# Patient Record
Sex: MALE | NOT HISPANIC OR LATINO | Employment: OTHER | URBAN - METROPOLITAN AREA
[De-identification: names, ages, dates, MRNs, and addresses within clinical notes are randomized per-mention and may not be internally consistent; named-entity substitution may affect disease eponyms.]

---

## 2021-04-20 ENCOUNTER — TRANSCRIBE ORDERS (OUTPATIENT)
Dept: ADMINISTRATIVE | Facility: HOSPITAL | Age: 66
End: 2021-04-20

## 2021-04-20 DIAGNOSIS — R31.29 MICROSCOPIC HEMATURIA: Primary | ICD-10-CM

## 2021-05-24 ENCOUNTER — TRANSCRIBE ORDERS (OUTPATIENT)
Dept: ADMINISTRATIVE | Facility: HOSPITAL | Age: 66
End: 2021-05-24

## 2021-05-24 DIAGNOSIS — M89.8X5 LYTIC BONE LESION OF HIP: Primary | ICD-10-CM

## 2021-08-19 ENCOUNTER — TRANSCRIBE ORDERS (OUTPATIENT)
Dept: ADMINISTRATIVE | Facility: HOSPITAL | Age: 66
End: 2021-08-19

## 2021-08-25 ENCOUNTER — TRANSCRIBE ORDERS (OUTPATIENT)
Dept: ADMINISTRATIVE | Facility: HOSPITAL | Age: 66
End: 2021-08-25

## 2021-08-27 ENCOUNTER — TRANSCRIBE ORDERS (OUTPATIENT)
Dept: ADMINISTRATIVE | Facility: HOSPITAL | Age: 66
End: 2021-08-27

## 2021-08-27 DIAGNOSIS — M89.8X5 LYTIC BONE LESION OF HIP: Primary | ICD-10-CM

## 2021-09-14 ENCOUNTER — HOSPITAL ENCOUNTER (OUTPATIENT)
Dept: NUCLEAR MEDICINE | Facility: HOSPITAL | Age: 66
Discharge: HOME | End: 2021-09-14

## 2021-09-14 DIAGNOSIS — M89.8X5 LYTIC BONE LESION OF HIP: ICD-10-CM

## 2021-09-14 PROCEDURE — 78306 BONE IMAGING WHOLE BODY: CPT

## 2021-09-14 PROCEDURE — A9503 TC99M MEDRONATE: HCPCS | Performed by: STUDENT IN AN ORGANIZED HEALTH CARE EDUCATION/TRAINING PROGRAM

## 2021-09-14 PROCEDURE — 0 TECHNETIUM MEDRONATE KIT: Performed by: STUDENT IN AN ORGANIZED HEALTH CARE EDUCATION/TRAINING PROGRAM

## 2021-09-14 RX ORDER — TC 99M MEDRONATE 20 MG/10ML
26.5 INJECTION, POWDER, LYOPHILIZED, FOR SOLUTION INTRAVENOUS
Status: COMPLETED | OUTPATIENT
Start: 2021-09-14 | End: 2021-09-14

## 2021-09-14 RX ADMIN — Medication 26.5 MILLICURIE: at 09:30

## 2021-10-12 ENCOUNTER — CONSULT (OUTPATIENT)
Dept: ONCOLOGY | Facility: CLINIC | Age: 66
End: 2021-10-12

## 2021-10-12 ENCOUNTER — LAB (OUTPATIENT)
Dept: LAB | Facility: HOSPITAL | Age: 66
End: 2021-10-12

## 2021-10-12 VITALS
HEIGHT: 72 IN | DIASTOLIC BLOOD PRESSURE: 80 MMHG | SYSTOLIC BLOOD PRESSURE: 121 MMHG | RESPIRATION RATE: 16 BRPM | WEIGHT: 246.5 LBS | TEMPERATURE: 97.4 F | HEART RATE: 62 BPM | OXYGEN SATURATION: 95 % | BODY MASS INDEX: 33.39 KG/M2

## 2021-10-12 DIAGNOSIS — C78.7 SECONDARY MALIGNANT NEOPLASM OF LIVER AND INTRAHEPATIC BILE DUCT (HCC): ICD-10-CM

## 2021-10-12 DIAGNOSIS — R94.8 ABNORMAL BONE SCAN OF THORACIC SPINE: ICD-10-CM

## 2021-10-12 DIAGNOSIS — C80.1 PRIMARY CANCER OF UNKNOWN SITE (HCC): Primary | ICD-10-CM

## 2021-10-12 DIAGNOSIS — K76.9 LESION OF LIVER: ICD-10-CM

## 2021-10-12 DIAGNOSIS — R93.2 ABNORMAL FINDINGS ON DIAGNOSTIC IMAGING OF LIVER AND BILIARY TRACT: ICD-10-CM

## 2021-10-12 DIAGNOSIS — R79.89 OTHER SPECIFIED ABNORMAL FINDINGS OF BLOOD CHEMISTRY: ICD-10-CM

## 2021-10-12 LAB
ALBUMIN SERPL-MCNC: 4.5 G/DL (ref 3.5–5.2)
ALBUMIN/GLOB SERPL: 2.3 G/DL
ALP SERPL-CCNC: 103 U/L (ref 39–117)
ALT SERPL W P-5'-P-CCNC: 23 U/L (ref 1–41)
ANION GAP SERPL CALCULATED.3IONS-SCNC: 11 MMOL/L (ref 5–15)
AST SERPL-CCNC: 20 U/L (ref 1–40)
BASOPHILS # BLD AUTO: 0.03 10*3/MM3 (ref 0–0.2)
BASOPHILS NFR BLD AUTO: 0.5 % (ref 0–1.5)
BILIRUB SERPL-MCNC: 0.6 MG/DL (ref 0–1.2)
BUN SERPL-MCNC: 23 MG/DL (ref 8–23)
BUN/CREAT SERPL: 21.3 (ref 7–25)
CALCIUM SPEC-SCNC: 9.4 MG/DL (ref 8.6–10.5)
CHLORIDE SERPL-SCNC: 102 MMOL/L (ref 98–107)
CO2 SERPL-SCNC: 27 MMOL/L (ref 22–29)
CREAT SERPL-MCNC: 1.08 MG/DL (ref 0.76–1.27)
DEPRECATED RDW RBC AUTO: 41.5 FL (ref 37–54)
EOSINOPHIL # BLD AUTO: 0.17 10*3/MM3 (ref 0–0.4)
EOSINOPHIL NFR BLD AUTO: 2.6 % (ref 0.3–6.2)
ERYTHROCYTE [DISTWIDTH] IN BLOOD BY AUTOMATED COUNT: 12.9 % (ref 12.3–15.4)
GFR SERPL CREATININE-BSD FRML MDRD: 68 ML/MIN/1.73
GFR SERPL CREATININE-BSD FRML MDRD: 83 ML/MIN/1.73
GLOBULIN UR ELPH-MCNC: 2 GM/DL
GLUCOSE SERPL-MCNC: 139 MG/DL (ref 65–99)
HCT VFR BLD AUTO: 41.9 % (ref 37.5–51)
HGB BLD-MCNC: 14.9 G/DL (ref 13–17.7)
IMM GRANULOCYTES # BLD AUTO: 0.06 10*3/MM3 (ref 0–0.05)
IMM GRANULOCYTES NFR BLD AUTO: 0.9 % (ref 0–0.5)
LYMPHOCYTES # BLD AUTO: 1.25 10*3/MM3 (ref 0.7–3.1)
LYMPHOCYTES NFR BLD AUTO: 19.1 % (ref 19.6–45.3)
MCH RBC QN AUTO: 31.5 PG (ref 26.6–33)
MCHC RBC AUTO-ENTMCNC: 35.6 G/DL (ref 31.5–35.7)
MCV RBC AUTO: 88.6 FL (ref 79–97)
MONOCYTES # BLD AUTO: 0.62 10*3/MM3 (ref 0.1–0.9)
MONOCYTES NFR BLD AUTO: 9.5 % (ref 5–12)
NEUTROPHILS NFR BLD AUTO: 4.4 10*3/MM3 (ref 1.7–7)
NEUTROPHILS NFR BLD AUTO: 67.4 % (ref 42.7–76)
NRBC BLD AUTO-RTO: 0 /100 WBC (ref 0–0.2)
PLATELET # BLD AUTO: 163 10*3/MM3 (ref 140–450)
PMV BLD AUTO: 10.4 FL (ref 6–12)
POTASSIUM SERPL-SCNC: 4 MMOL/L (ref 3.5–5.2)
PROT SERPL-MCNC: 6.5 G/DL (ref 6–8.5)
RBC # BLD AUTO: 4.73 10*6/MM3 (ref 4.14–5.8)
SODIUM SERPL-SCNC: 140 MMOL/L (ref 136–145)
WBC # BLD AUTO: 6.53 10*3/MM3 (ref 3.4–10.8)

## 2021-10-12 PROCEDURE — 84153 ASSAY OF PSA TOTAL: CPT

## 2021-10-12 PROCEDURE — 82105 ALPHA-FETOPROTEIN SERUM: CPT

## 2021-10-12 PROCEDURE — 80053 COMPREHEN METABOLIC PANEL: CPT

## 2021-10-12 PROCEDURE — 86301 IMMUNOASSAY TUMOR CA 19-9: CPT

## 2021-10-12 PROCEDURE — 36415 COLL VENOUS BLD VENIPUNCTURE: CPT

## 2021-10-12 PROCEDURE — 82232 ASSAY OF BETA-2 PROTEIN: CPT

## 2021-10-12 PROCEDURE — 83883 ASSAY NEPHELOMETRY NOT SPEC: CPT

## 2021-10-12 PROCEDURE — 85025 COMPLETE CBC W/AUTO DIFF WBC: CPT

## 2021-10-12 PROCEDURE — 84165 PROTEIN E-PHORESIS SERUM: CPT

## 2021-10-12 PROCEDURE — 82378 CARCINOEMBRYONIC ANTIGEN: CPT

## 2021-10-12 PROCEDURE — 99204 OFFICE O/P NEW MOD 45 MIN: CPT | Performed by: INTERNAL MEDICINE

## 2021-10-12 PROCEDURE — 82784 ASSAY IGA/IGD/IGG/IGM EACH: CPT

## 2021-10-12 PROCEDURE — 86334 IMMUNOFIX E-PHORESIS SERUM: CPT

## 2021-10-12 RX ORDER — LORATADINE 10 MG/1
10 TABLET ORAL DAILY
COMMUNITY

## 2021-10-12 RX ORDER — METOPROLOL SUCCINATE 100 MG/1
100 TABLET, EXTENDED RELEASE ORAL DAILY
COMMUNITY

## 2021-10-12 RX ORDER — ALLOPURINOL 100 MG/1
100 TABLET ORAL DAILY
COMMUNITY

## 2021-10-12 RX ORDER — ATORVASTATIN CALCIUM 80 MG/1
80 TABLET, FILM COATED ORAL DAILY
COMMUNITY

## 2021-10-12 RX ORDER — ASPIRIN 81 MG/1
81 TABLET ORAL DAILY
COMMUNITY

## 2021-10-12 RX ORDER — D-METHORPHAN/PE/ACETAMINOPHEN 10-5-325MG
CAPSULE ORAL
COMMUNITY

## 2021-10-12 RX ORDER — AMLODIPINE BESYLATE 2.5 MG/1
2.5 TABLET ORAL DAILY
COMMUNITY

## 2021-10-12 NOTE — PROGRESS NOTES
New Patient Office Visit      Date: 10/12/2021     Patient Name: Bhaskar Torres  MRN: [id number]  : 1955  Referring Physician: Ramesh Trivedi    Chief Complaint: Establish care for abnormal bone scan and liver lesions    History of Present Illness: Bhaskar Torres is a pleasant 66 y.o. male the past medical history of prostate cancer status post prostatectomy in , hypertension, gout, type 2 diabetes, hyperlipidemia who presents today for evaluation of abnormal bone scan and liver lesions. The patient has been having off-and-on back pain for the past several years which is been worse over the past couple months.  He had a CT scan in May 2021 which was concerning for lesions within the thoracic spine as well as some concerning liver lesions.  He underwent a bone scan which confirmed multiple concerning lesions within the thoracic spine.  He states he has not had any further biopsies at this time.  Does note that he had a chest x-ray in 2021 which per the patient was clear.  He denies any active smoking history.  He does note that he has some testicular swelling as well as sweating.  He denies any unexplained weight loss, fevers, night sweats.  He is otherwise stable and denies any vision changes, headaches, chest pain, palpitation, shortness of breath, cough, nausea, vomiting, diarrhea    Oncology History:    Oncology/Hematology History    No history exists.       Subjective      Review of Systems:     Constitutional: Negative for fevers, chills, or weight loss  Eyes: Negative for blurred vision or discharge         Ear/Nose/Throat: Negative for difficulty swallowing, sore throat, LAD                                                       Respiratory: Negative for cough, SOA, wheezing                                                                                        Cardiovascular: Negative for chest pain or palpitations                                                                   Gastrointestinal: Negative for nausea, vomiting or diarrhea                                                                     Genitourinary: Negative for dysuria or hematuria                                                                                           Musculoskeletal: Negative for any joint pains or muscle aches                                                                        Neurologic: Negative for any weakness, headaches, dizziness                                                                         Hematologic: Negative for any easy bleeding or bruising                                                                                   Psychiatric: Negative for anxiety or depression                             Past Medical History:   Past Medical History:   Diagnosis Date   • Arthritis    • Asthma    • Bronchitis    • Diabetes mellitus (HCC)    • Gout    • Hypertension    • Kidney stones    • Prostate cancer (HCC)        Past Surgical History:   Past Surgical History:   Procedure Laterality Date   • ADENOIDECTOMY     • HERNIA REPAIR     • PROSTATECTOMY     • THYROID SURGERY Left    • TONSILLECTOMY         Family History:   Family History   Problem Relation Age of Onset   • Heart disease Father    • Pancreatic cancer Maternal Grandfather 72       Social History:   Social History     Socioeconomic History   • Marital status: Unknown   Tobacco Use   • Smoking status: Never Smoker   • Smokeless tobacco: Never Used   Substance and Sexual Activity   • Alcohol use: Yes     Comment: 35oz per week   • Drug use: Never       Medications:     Current Outpatient Medications:   •  allopurinol (ZYLOPRIM) 100 MG tablet, Take 100 mg by mouth Daily., Disp: , Rfl:   •  amLODIPine (NORVASC) 2.5 MG tablet, Take 2.5 mg by mouth Daily., Disp: , Rfl:   •  aspirin 81 MG EC tablet, Take 81 mg by mouth Daily., Disp: , Rfl:   •  atorvastatin (LIPITOR) 80 MG tablet, Take 80 mg by mouth Daily., Disp: , Rfl:   •   "loratadine (CLARITIN) 10 MG tablet, Take 10 mg by mouth Daily., Disp: , Rfl:   •  metFORMIN (GLUCOPHAGE) 500 MG tablet, Take 500 mg by mouth 2 (Two) Times a Day With Meals., Disp: , Rfl:   •  metoprolol succinate XL (TOPROL-XL) 100 MG 24 hr tablet, Take 100 mg by mouth Daily., Disp: , Rfl:   •  Pediatric Multiple Vit-C-FA (Flinstones Gummies Omega-3 DHA) chewable tablet, Chew., Disp: , Rfl:   •  POTASSIUM PO, Take  by mouth., Disp: , Rfl:   •  VITAMIN D PO, Take  by mouth., Disp: , Rfl:     Allergies:   Allergies   Allergen Reactions   • Amoxicillin Other (See Comments)     Causes hair to fall out       Objective     Physical Exam:  Vital Signs:   Vitals:    10/12/21 1444   BP: 121/80   Pulse: 62   Resp: 16   Temp: 97.4 °F (36.3 °C)   TempSrc: Temporal   SpO2: 95%   Weight: 112 kg (246 lb 8 oz)   Height: 182.9 cm (72\")   PainSc:   3   PainLoc: Generalized     Pain Score    10/12/21 1444   PainSc:   3   PainLoc: Generalized     ECOG Performance Status: 0 - Asymptomatic    Constitutional: NAD, ECOG 0  Eyes: PERRLA, scleral anicteric  ENT: No LAD, no thyromegaly  Respiratory: CTAB, no wheezing, rales, rhonchi  Cardiovascular: RRR, no murmurs, pulses 2+ bilaterally  Abdomen: soft, NT/ND, no HSM  Musculoskeletal: strength 5/5 bilaterally, no c/c/e  Neurologic: A&O x 3, CN II-XII intact grossly  Psych: mood and affect congruent, no SI or HI    Results Review:   No visits with results within 2 Week(s) from this visit.   Latest known visit with results is:   No results found for any previous visit.       NM Bone Scan Whole Body    Result Date: 9/16/2021  Narrative: EXAMINATION: NM WHOLE-BODY BONE SCAN - 09/14/2021  INDICATION: M89.8X5-Other specified disorders of bone, thigh. Abnormality within the knees with back pain.  TECHNIQUE: 26.9 mCi of technetium 99 MDP was injected intravenously. Imaging is obtained of the whole body 4 hours later in the anterior and posterior projections. Spot imaging was obtained of the skull on " the lateral projection pelvis in the oblique projection, ribs in the oblique projection and hands in the lateral projection.  COMPARISON: None  FINDINGS: There is normal uptake of tracer seen within the pelvis and hips bilaterally. There is abnormal uptake of tracer involving the T11 vertebral body level, T7 and C6 levels. Findings concerning for possible underlying process such as malignancy or metastatic disease. Correlation to imaging of the thoracic spine is recommended. There is normal physiologic activity seen within the soft tissues, kidneys, and bladder. Degenerative changes seen within the knees and feet.      Impression: Abnormal uptake of tracer seen within the thoracic spine concerning for possible underlying lesions in which correlation to the thoracic spine imaging is recommended. The remainder of the bony skeleton reveals degenerative changes. No abnormality seen within the hips.   DICTATED:   09/15/2021 EDITED/ls :   09/15/2021  This report was finalized on 9/16/2021 5:16 PM by Dr. Marysol Warren MD.        Assessment / Plan      Assessment/Plan:   1. Primary cancer of unknown site (HCC) (Primary)  -High concern for malignancy given his history of prostate cancer and abnormal bone scan showing multiple thoracic spine lesions  -CT abdomen/pelvis in May 2021 concerning for multiple liver lesions  -We will get MRI abdomen to further evaluate his liver lesions as this may be more amenable to biopsy rather than his thoracic spine lesion  -We will check tumor markers as below as there is high concern for recurrent prostate cancer and potential colon cancer as he has not had a colonoscopy since 2016  -Depending on results, will consider further more definitive testing and biopsies  -     CT Chest With Contrast Diagnostic; Future  -     MRI Abdomen With & Without Contrast; Future    2. Abnormal bone scan of thoracic spine  -     CBC & Differential; Future  -     Comprehensive Metabolic Panel; Future  -      PSA DIAGNOSTIC; Future  -     Beta 2 Microglobulin, Serum; Future  -     MANNIE + PE; Future  -     Immunoglobulin Free LT Chains Blood; Future    3. Lesion of liver  -     CEA; Future  -     MRI Abdomen With & Without Contrast; Future    4. Other specified abnormal findings of blood chemistry   -     CEA; Future  -     Cancer Antigen 19-9; Future  -     AFP Tumor Marker; Future    5. 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 Abnormal findings on diagnostic imaging of liver and biliary tract   -     AFP Tumor Marker; Future           Follow Up:   Follow-up in 2-3 weeks     Rafa Tam MD  Hematology and Oncology     Please note that portions of this note may have been completed with a voice recognition program. Efforts were made to edit the dictations, but occasionally words are mistranscribed.

## 2021-10-13 LAB
ALPHA-FETOPROTEIN: 3.66 NG/ML (ref 0–8.3)
B2 MICROGLOB SERPL-MCNC: 2 MG/L (ref 0.8–2.2)
CANCER AG19-9 SERPL-ACNC: 35.7 U/ML
CEA SERPL-MCNC: 2.24 NG/ML
PSA SERPL-MCNC: <0.014 NG/ML (ref 0–4)

## 2021-10-14 LAB
ALBUMIN SERPL ELPH-MCNC: 4.1 G/DL (ref 2.9–4.4)
ALBUMIN/GLOB SERPL: 1.7 {RATIO} (ref 0.7–1.7)
ALPHA1 GLOB SERPL ELPH-MCNC: 0.2 G/DL (ref 0–0.4)
ALPHA2 GLOB SERPL ELPH-MCNC: 0.7 G/DL (ref 0.4–1)
B-GLOBULIN SERPL ELPH-MCNC: 1.1 G/DL (ref 0.7–1.3)
GAMMA GLOB SERPL ELPH-MCNC: 0.6 G/DL (ref 0.4–1.8)
GLOBULIN SER-MCNC: 2.5 G/DL (ref 2.2–3.9)
IGA SERPL-MCNC: 137 MG/DL (ref 61–437)
IGG SERPL-MCNC: 563 MG/DL (ref 603–1613)
IGM SERPL-MCNC: 33 MG/DL (ref 20–172)
INTERPRETATION SERPL IEP-IMP: ABNORMAL
KAPPA LC FREE SER-MCNC: 13.7 MG/L (ref 3.3–19.4)
KAPPA LC FREE/LAMBDA FREE SER: 1.22 {RATIO} (ref 0.26–1.65)
LABORATORY COMMENT REPORT: ABNORMAL
LAMBDA LC FREE SERPL-MCNC: 11.2 MG/L (ref 5.7–26.3)
M PROTEIN SERPL ELPH-MCNC: ABNORMAL G/DL
PROT SERPL-MCNC: 6.6 G/DL (ref 6–8.5)

## 2021-11-12 ENCOUNTER — OFFICE VISIT (OUTPATIENT)
Dept: ONCOLOGY | Facility: CLINIC | Age: 66
End: 2021-11-12

## 2021-11-12 VITALS
RESPIRATION RATE: 16 BRPM | HEART RATE: 66 BPM | SYSTOLIC BLOOD PRESSURE: 147 MMHG | OXYGEN SATURATION: 97 % | WEIGHT: 242.6 LBS | DIASTOLIC BLOOD PRESSURE: 86 MMHG | BODY MASS INDEX: 32.86 KG/M2 | TEMPERATURE: 96.2 F | HEIGHT: 72 IN

## 2021-11-12 DIAGNOSIS — R94.8 ABNORMAL BONE SCAN OF THORACIC SPINE: Primary | ICD-10-CM

## 2021-11-12 DIAGNOSIS — K76.9 LESION OF LIVER: ICD-10-CM

## 2021-11-12 PROCEDURE — 99214 OFFICE O/P EST MOD 30 MIN: CPT | Performed by: INTERNAL MEDICINE

## 2021-11-12 NOTE — PROGRESS NOTES
Follow Up Office Visit      Date: 2021     Patient Name: Bhaskar Torres  MRN: [id number]  : 1955  Referring Physician: Ramesh Trivedi     Chief Complaint:  Follow-up for abnormal bone scan and liver lesions     History of Present Illness: Bhaskar Torres is a pleasant 66 y.o. male the past medical history of prostate cancer status post prostatectomy in , hypertension, gout, type 2 diabetes, hyperlipidemia who presents today for evaluation of abnormal bone scan and liver lesions. The patient has been having off-and-on back pain for the past several years which is been worse over the past couple months.  He had a CT scan in May 2021 which was concerning for lesions within the thoracic spine as well as some concerning liver lesions.  He underwent a bone scan which confirmed multiple concerning lesions within the thoracic spine.  He states he has not had any further biopsies at this time.  Does note that he had a chest x-ray in 2021 which per the patient was clear.  He denies any active smoking history.  He does note that he has some testicular swelling as well as sweating.  He denies any unexplained weight loss, fevers, night sweats.  He is otherwise stable and denies any vision changes, headaches, chest pain, palpitation, shortness of breath, cough, nausea, vomiting, diarrhea    Interval History:  Presents to clinic for follow-up.  Continues to have significant back pain which is affecting his quality of life.  Otherwise denies any unexplained weight loss, fevers, chills, night sweats, abdominal pain    Oncology History:    Oncology/Hematology History    No history exists.       Subjective      Review of Systems:   Constitutional: Negative for fevers, chills, or weight loss  Eyes: Negative for blurred vision or discharge         Ear/Nose/Throat: Negative for difficulty swallowing, sore throat, LAD                                                       Respiratory: Negative for cough, SOA,  wheezing                                                                                        Cardiovascular: Negative for chest pain or palpitations                                                                  Gastrointestinal: Negative for nausea, vomiting or diarrhea                                                                     Genitourinary: Negative for dysuria or hematuria                                                                                           Musculoskeletal: Negative for any joint pains or muscle aches                                                                        Neurologic: Negative for any weakness, headaches, dizziness                                                                         Hematologic: Negative for any easy bleeding or bruising                                                                                   Psychiatric: Negative for anxiety or depression                          Past Medical History/Past Surgical History/ Family History/ Social History: Reviewed by me and unchanged from my previous documentation done on October 2021.     Medications:     Current Outpatient Medications:   •  allopurinol (ZYLOPRIM) 100 MG tablet, Take 100 mg by mouth Daily., Disp: , Rfl:   •  amLODIPine (NORVASC) 2.5 MG tablet, Take 2.5 mg by mouth Daily., Disp: , Rfl:   •  aspirin 81 MG EC tablet, Take 81 mg by mouth Daily., Disp: , Rfl:   •  atorvastatin (LIPITOR) 80 MG tablet, Take 80 mg by mouth Daily., Disp: , Rfl:   •  loratadine (CLARITIN) 10 MG tablet, Take 10 mg by mouth Daily., Disp: , Rfl:   •  metFORMIN (GLUCOPHAGE) 500 MG tablet, Take 500 mg by mouth 2 (Two) Times a Day With Meals., Disp: , Rfl:   •  metoprolol succinate XL (TOPROL-XL) 100 MG 24 hr tablet, Take 100 mg by mouth Daily., Disp: , Rfl:   •  Pediatric Multiple Vit-C-FA (Flinstones Gummies Omega-3 DHA) chewable tablet, Chew., Disp: , Rfl:   •  POTASSIUM PO, Take  by mouth., Disp: , Rfl:   •   "VITAMIN D PO, Take  by mouth., Disp: , Rfl:     Allergies:   Allergies   Allergen Reactions   • Amoxicillin Other (See Comments)     Causes hair to fall out       Objective     Physical Exam:  Vital Signs:   Vitals:    11/12/21 0825   BP: 147/86   Pulse: 66   Resp: 16   Temp: 96.2 °F (35.7 °C)   TempSrc: Temporal   SpO2: 97%   Weight: 110 kg (242 lb 9.6 oz)   Height: 182.9 cm (72\")   PainSc:   4   PainLoc: Foot  Comment: feet, knees, back, shoulder     Pain Score    11/12/21 0825   PainSc:   4   PainLoc: Foot  Comment: feet, knees, back, shoulder     ECOG Performance Status: 1 - Symptomatic but completely ambulatory    Constitutional: NAD, ECOG 1  Eyes: PERRLA, scleral anicteric  ENT: No LAD, no thyromegaly  Respiratory: CTAB, no wheezing, rales, rhonchi  Cardiovascular: RRR, no murmurs, pulses 2+ bilaterally  Abdomen: soft, NT/ND, no HSM  Musculoskeletal: strength 5/5 bilaterally, no c/c/e  Neurologic: A&O x 3, CN II-XII intact grossly    Results Review:   No visits with results within 2 Week(s) from this visit.   Latest known visit with results is:   Lab on 10/12/2021   Component Date Value Ref Range Status   • Glucose 10/12/2021 139* 65 - 99 mg/dL Final   • BUN 10/12/2021 23  8 - 23 mg/dL Final   • Creatinine 10/12/2021 1.08  0.76 - 1.27 mg/dL Final   • Sodium 10/12/2021 140  136 - 145 mmol/L Final   • Potassium 10/12/2021 4.0  3.5 - 5.2 mmol/L Final   • Chloride 10/12/2021 102  98 - 107 mmol/L Final   • CO2 10/12/2021 27.0  22.0 - 29.0 mmol/L Final   • Calcium 10/12/2021 9.4  8.6 - 10.5 mg/dL Final   • Total Protein 10/12/2021 6.5  6.0 - 8.5 g/dL Final   • Albumin 10/12/2021 4.50  3.50 - 5.20 g/dL Final   • ALT (SGPT) 10/12/2021 23  1 - 41 U/L Final   • AST (SGOT) 10/12/2021 20  1 - 40 U/L Final   • Alkaline Phosphatase 10/12/2021 103  39 - 117 U/L Final   • Total Bilirubin 10/12/2021 0.6  0.0 - 1.2 mg/dL Final   • eGFR Non  Amer 10/12/2021 68  >60 mL/min/1.73 Final   • eGFR   Amer 10/12/2021 83  " >60 mL/min/1.73 Final   • Globulin 10/12/2021 2.0  gm/dL Final   • A/G Ratio 10/12/2021 2.3  g/dL Final   • BUN/Creatinine Ratio 10/12/2021 21.3  7.0 - 25.0 Final   • Anion Gap 10/12/2021 11.0  5.0 - 15.0 mmol/L Final   • PSA 10/12/2021 <0.014  0.000 - 4.000 ng/mL Final   • CEA 10/12/2021 2.24  ng/mL Final   • CA 19-9 10/12/2021 35.7* <=35.0 U/mL Final   • ALPHA-FETOPROTEIN 10/12/2021 3.66  0 - 8.3 ng/mL Final   • Beta-2 Microglobulin 10/12/2021 2.0  0.8 - 2.2 mg/L Final   • IgG 10/12/2021 563* 603 - 1613 mg/dL Final   • IgA 10/12/2021 137  61 - 437 mg/dL Final   • IgM 10/12/2021 33  20 - 172 mg/dL Final   • Total Protein 10/12/2021 6.6  6.0 - 8.5 g/dL Final   • Albumin 10/12/2021 4.1  2.9 - 4.4 g/dL Final   • Alpha-1-Globulin 10/12/2021 0.2  0.0 - 0.4 g/dL Final   • Alpha-2-Globulin 10/12/2021 0.7  0.4 - 1.0 g/dL Final   • Beta Globulin 10/12/2021 1.1  0.7 - 1.3 g/dL Final   • Gamma Globulin 10/12/2021 0.6  0.4 - 1.8 g/dL Final   • M-Adelso 10/12/2021 Not Observed  Not Observed g/dL Final   • Globulin 10/12/2021 2.5  2.2 - 3.9 g/dL Final   • A/G Ratio 10/12/2021 1.7  0.7 - 1.7 Final   • Immunofixation Reflex, Serum 10/12/2021 Comment:   Final    Presence of monoclonal protein is unclear at this time. Suggest  repeat in 3 to 6 months if clinically indicated.   • Please note 10/12/2021 Comment   Final    Protein electrophoresis scan will follow via computer, mail, or   delivery.   • Free Light Chain, Kappa 10/12/2021 13.7  3.3 - 19.4 mg/L Final   • Free Lambda Light Chains 10/12/2021 11.2  5.7 - 26.3 mg/L Final   • Kappa/Lambda Ratio 10/12/2021 1.22  0.26 - 1.65 Final   • WBC 10/12/2021 6.53  3.40 - 10.80 10*3/mm3 Final   • RBC 10/12/2021 4.73  4.14 - 5.80 10*6/mm3 Final   • Hemoglobin 10/12/2021 14.9  13.0 - 17.7 g/dL Final   • Hematocrit 10/12/2021 41.9  37.5 - 51.0 % Final   • MCV 10/12/2021 88.6  79.0 - 97.0 fL Final   • MCH 10/12/2021 31.5  26.6 - 33.0 pg Final   • MCHC 10/12/2021 35.6  31.5 - 35.7 g/dL  Final   • RDW 10/12/2021 12.9  12.3 - 15.4 % Final   • RDW-SD 10/12/2021 41.5  37.0 - 54.0 fl Final   • MPV 10/12/2021 10.4  6.0 - 12.0 fL Final   • Platelets 10/12/2021 163  140 - 450 10*3/mm3 Final   • Neutrophil % 10/12/2021 67.4  42.7 - 76.0 % Final   • Lymphocyte % 10/12/2021 19.1* 19.6 - 45.3 % Final   • Monocyte % 10/12/2021 9.5  5.0 - 12.0 % Final   • Eosinophil % 10/12/2021 2.6  0.3 - 6.2 % Final   • Basophil % 10/12/2021 0.5  0.0 - 1.5 % Final   • Immature Grans % 10/12/2021 0.9* 0.0 - 0.5 % Final   • Neutrophils, Absolute 10/12/2021 4.40  1.70 - 7.00 10*3/mm3 Final   • Lymphocytes, Absolute 10/12/2021 1.25  0.70 - 3.10 10*3/mm3 Final   • Monocytes, Absolute 10/12/2021 0.62  0.10 - 0.90 10*3/mm3 Final   • Eosinophils, Absolute 10/12/2021 0.17  0.00 - 0.40 10*3/mm3 Final   • Basophils, Absolute 10/12/2021 0.03  0.00 - 0.20 10*3/mm3 Final   • Immature Grans, Absolute 10/12/2021 0.06* 0.00 - 0.05 10*3/mm3 Final   • nRBC 10/12/2021 0.0  0.0 - 0.2 /100 WBC Final       No results found.    Assessment / Plan      Assessment/Plan:   1. Abnormal bone scan of thoracic spine (Primary)  -Initially high concern for malignancy given his history of prostate cancer and abnormal bone scan showing multiple thoracic spine lesions  -CT abdomen/pelvis in May 2021 concerning for multiple liver lesions  -MRI abdomen showing only benign liver cyst and renal cyst  -CT chest showing no thoracic nodule/lesion and only degenerative changes in the thoracic spine correlating with the abnormalities on the bone scan  -PSA, CEA, AFP within normal limits.  CA 19-9 mildly elevated at 35.7 however upper limit of normal is 35 and he has no overt liver or pancreatic lesion seen on MRI abdomen so low concern for any intraabdominal malignancy at this time  -SPEP showing no M spike but uncertain presence of monoclonal protein.  Kappa/lambda light chains within normal limits, beta-2 microglobulin within normal limits.  Low concern for a plasma  cell dyscrasia.  No further work-up required  -At this point, patient does not have any evidence of active malignancy.  Abnormality seen on bone scan likely due to degenerative changes and osteoarthritis  -No further oncologic work-up required at this time including biopsy  -Advised patient to continue to follow with his PCP for further management of his back pain  -Advised patient to follow-up with his PCP for referrals for screening colonoscopies    2. Lesion of liver  -MRI abdomen showing 3 benign liver cyst  -CEA, AFP within normal limits  -No further work-up required      Follow Up:   Follow-up as needed     Rafa Tam MD  Hematology and Oncology     Please note that portions of this note may have been completed with a voice recognition program. Efforts were made to edit the dictations, but occasionally words are mistranscribed.

## 2023-10-13 ENCOUNTER — HOSPITAL ENCOUNTER (OUTPATIENT)
Facility: HOSPITAL | Age: 68
Discharge: HOME | End: 2023-10-15
Attending: STUDENT IN AN ORGANIZED HEALTH CARE EDUCATION/TRAINING PROGRAM | Admitting: INTERNAL MEDICINE
Payer: MEDICARE

## 2023-10-13 ENCOUNTER — APPOINTMENT (OUTPATIENT)
Dept: CT IMAGING | Facility: HOSPITAL | Age: 68
End: 2023-10-13
Payer: MEDICARE

## 2023-10-13 DIAGNOSIS — E11.65 TYPE 2 DIABETES MELLITUS WITH HYPERGLYCEMIA, WITHOUT LONG-TERM CURRENT USE OF INSULIN: Primary | ICD-10-CM

## 2023-10-13 DIAGNOSIS — E86.0 DEHYDRATION: ICD-10-CM

## 2023-10-13 DIAGNOSIS — R53.83 FATIGUE, UNSPECIFIED TYPE: ICD-10-CM

## 2023-10-13 DIAGNOSIS — R10.9 ABDOMINAL PAIN: ICD-10-CM

## 2023-10-13 DIAGNOSIS — R10.13 EPIGASTRIC PAIN: ICD-10-CM

## 2023-10-13 PROBLEM — B37.0 ORAL CANDIDIASIS: Status: ACTIVE | Noted: 2023-10-13

## 2023-10-13 PROBLEM — R63.4 WEIGHT LOSS: Status: ACTIVE | Noted: 2023-10-13

## 2023-10-13 LAB
ALBUMIN SERPL-MCNC: 4.3 G/DL (ref 3.5–5.2)
ALBUMIN/GLOB SERPL: 2 G/DL
ALP SERPL-CCNC: 105 U/L (ref 39–117)
ALT SERPL W P-5'-P-CCNC: 44 U/L (ref 1–41)
ANION GAP SERPL CALCULATED.3IONS-SCNC: 10 MMOL/L (ref 5–15)
ANION GAP SERPL CALCULATED.3IONS-SCNC: 8 MMOL/L (ref 5–15)
AST SERPL-CCNC: 19 U/L (ref 1–40)
B-OH-BUTYR SERPL-SCNC: 0.72 MMOL/L (ref 0.02–0.27)
BACTERIA UR QL AUTO: ABNORMAL /HPF
BASOPHILS # BLD AUTO: 0.02 10*3/MM3 (ref 0–0.2)
BASOPHILS NFR BLD AUTO: 0.2 % (ref 0–1.5)
BILIRUB SERPL-MCNC: 1.3 MG/DL (ref 0–1.2)
BILIRUB UR QL STRIP: NEGATIVE
BUN SERPL-MCNC: 26 MG/DL (ref 8–23)
BUN SERPL-MCNC: 34 MG/DL (ref 8–23)
BUN/CREAT SERPL: 25.7 (ref 7–25)
BUN/CREAT SERPL: 26.4 (ref 7–25)
CALCIUM SPEC-SCNC: 8.8 MG/DL (ref 8.6–10.5)
CALCIUM SPEC-SCNC: 9.7 MG/DL (ref 8.6–10.5)
CHLORIDE SERPL-SCNC: 100 MMOL/L (ref 98–107)
CHLORIDE SERPL-SCNC: 102 MMOL/L (ref 98–107)
CLARITY UR: ABNORMAL
CO2 SERPL-SCNC: 30 MMOL/L (ref 22–29)
CO2 SERPL-SCNC: 30 MMOL/L (ref 22–29)
COLOR UR: YELLOW
CREAT BLDA-MCNC: 1.3 MG/DL
CREAT BLDA-MCNC: 1.3 MG/DL (ref 0.6–1.3)
CREAT SERPL-MCNC: 1.01 MG/DL (ref 0.76–1.27)
CREAT SERPL-MCNC: 1.29 MG/DL (ref 0.76–1.27)
D-LACTATE SERPL-SCNC: 1.7 MMOL/L (ref 0.5–2)
DEPRECATED RDW RBC AUTO: 45.1 FL (ref 37–54)
EGFRCR SERPLBLD CKD-EPI 2021: 60.4 ML/MIN/1.73
EGFRCR SERPLBLD CKD-EPI 2021: 81 ML/MIN/1.73
EOSINOPHIL # BLD AUTO: 0.05 10*3/MM3 (ref 0–0.4)
EOSINOPHIL NFR BLD AUTO: 0.6 % (ref 0.3–6.2)
ERYTHROCYTE [DISTWIDTH] IN BLOOD BY AUTOMATED COUNT: 13.5 % (ref 12.3–15.4)
GLOBULIN UR ELPH-MCNC: 2.1 GM/DL
GLUCOSE BLDC GLUCOMTR-MCNC: 250 MG/DL (ref 70–130)
GLUCOSE BLDC GLUCOMTR-MCNC: 263 MG/DL (ref 70–130)
GLUCOSE BLDC GLUCOMTR-MCNC: 318 MG/DL (ref 70–130)
GLUCOSE SERPL-MCNC: 277 MG/DL (ref 65–99)
GLUCOSE SERPL-MCNC: 447 MG/DL (ref 65–99)
GLUCOSE UR STRIP-MCNC: ABNORMAL MG/DL
HBA1C MFR BLD: 11.3 % (ref 4.8–5.6)
HCT VFR BLD AUTO: 43.6 % (ref 37.5–51)
HGB BLD-MCNC: 15.4 G/DL (ref 13–17.7)
HGB UR QL STRIP.AUTO: ABNORMAL
HOLD SPECIMEN: NORMAL
HYALINE CASTS UR QL AUTO: ABNORMAL /LPF
IMM GRANULOCYTES # BLD AUTO: 0.11 10*3/MM3 (ref 0–0.05)
IMM GRANULOCYTES NFR BLD AUTO: 1.3 % (ref 0–0.5)
KETONES UR QL STRIP: ABNORMAL
LEUKOCYTE ESTERASE UR QL STRIP.AUTO: NEGATIVE
LIPASE SERPL-CCNC: 29 U/L (ref 13–60)
LIPASE SERPL-CCNC: 36 U/L (ref 13–60)
LYMPHOCYTES # BLD AUTO: 0.54 10*3/MM3 (ref 0.7–3.1)
LYMPHOCYTES NFR BLD AUTO: 6.5 % (ref 19.6–45.3)
MAGNESIUM SERPL-MCNC: 1.9 MG/DL (ref 1.6–2.4)
MCH RBC QN AUTO: 32.6 PG (ref 26.6–33)
MCHC RBC AUTO-ENTMCNC: 35.3 G/DL (ref 31.5–35.7)
MCV RBC AUTO: 92.4 FL (ref 79–97)
MONOCYTES # BLD AUTO: 0.66 10*3/MM3 (ref 0.1–0.9)
MONOCYTES NFR BLD AUTO: 7.9 % (ref 5–12)
NEUTROPHILS NFR BLD AUTO: 6.97 10*3/MM3 (ref 1.7–7)
NEUTROPHILS NFR BLD AUTO: 83.5 % (ref 42.7–76)
NITRITE UR QL STRIP: NEGATIVE
NRBC BLD AUTO-RTO: 0 /100 WBC (ref 0–0.2)
PH UR STRIP.AUTO: 5.5 [PH] (ref 5–8)
PLATELET # BLD AUTO: 157 10*3/MM3 (ref 140–450)
PMV BLD AUTO: 10.2 FL (ref 6–12)
POTASSIUM SERPL-SCNC: 4 MMOL/L (ref 3.5–5.2)
POTASSIUM SERPL-SCNC: 4.3 MMOL/L (ref 3.5–5.2)
PROCALCITONIN SERPL-MCNC: 0.1 NG/ML (ref 0–0.25)
PROT SERPL-MCNC: 6.4 G/DL (ref 6–8.5)
PROT UR QL STRIP: NEGATIVE
RBC # BLD AUTO: 4.72 10*6/MM3 (ref 4.14–5.8)
RBC # UR STRIP: ABNORMAL /HPF
REF LAB TEST METHOD: ABNORMAL
SODIUM SERPL-SCNC: 140 MMOL/L (ref 136–145)
SODIUM SERPL-SCNC: 140 MMOL/L (ref 136–145)
SP GR UR STRIP: 1.04 (ref 1–1.03)
SQUAMOUS #/AREA URNS HPF: ABNORMAL /HPF
TSH SERPL DL<=0.05 MIU/L-ACNC: 1 UIU/ML (ref 0.27–4.2)
UROBILINOGEN UR QL STRIP: ABNORMAL
WBC # UR STRIP: ABNORMAL /HPF
WBC NRBC COR # BLD: 8.35 10*3/MM3 (ref 3.4–10.8)
WHOLE BLOOD HOLD COAG: NORMAL
WHOLE BLOOD HOLD SPECIMEN: NORMAL

## 2023-10-13 PROCEDURE — 63710000001 INSULIN LISPRO (HUMAN) PER 5 UNITS: Performed by: INTERNAL MEDICINE

## 2023-10-13 PROCEDURE — 82948 REAGENT STRIP/BLOOD GLUCOSE: CPT

## 2023-10-13 PROCEDURE — 86235 NUCLEAR ANTIGEN ANTIBODY: CPT | Performed by: INTERNAL MEDICINE

## 2023-10-13 PROCEDURE — 96365 THER/PROPH/DIAG IV INF INIT: CPT

## 2023-10-13 PROCEDURE — 25810000003 SODIUM CHLORIDE 0.9 % SOLUTION: Performed by: INTERNAL MEDICINE

## 2023-10-13 PROCEDURE — 84145 PROCALCITONIN (PCT): CPT | Performed by: INTERNAL MEDICINE

## 2023-10-13 PROCEDURE — 80053 COMPREHEN METABOLIC PANEL: CPT | Performed by: STUDENT IN AN ORGANIZED HEALTH CARE EDUCATION/TRAINING PROGRAM

## 2023-10-13 PROCEDURE — 83690 ASSAY OF LIPASE: CPT | Performed by: INTERNAL MEDICINE

## 2023-10-13 PROCEDURE — 83690 ASSAY OF LIPASE: CPT | Performed by: STUDENT IN AN ORGANIZED HEALTH CARE EDUCATION/TRAINING PROGRAM

## 2023-10-13 PROCEDURE — 96375 TX/PRO/DX INJ NEW DRUG ADDON: CPT

## 2023-10-13 PROCEDURE — 63710000001 INSULIN REGULAR HUMAN PER 5 UNITS: Performed by: INTERNAL MEDICINE

## 2023-10-13 PROCEDURE — 63710000001 INSULIN DETEMIR PER 5 UNITS: Performed by: INTERNAL MEDICINE

## 2023-10-13 PROCEDURE — 25010000002 THIAMINE PER 100 MG: Performed by: INTERNAL MEDICINE

## 2023-10-13 PROCEDURE — 25010000002 FLUCONAZOLE PER 200 MG: Performed by: INTERNAL MEDICINE

## 2023-10-13 PROCEDURE — 25010000002 ONDANSETRON PER 1 MG: Performed by: NURSE PRACTITIONER

## 2023-10-13 PROCEDURE — 83605 ASSAY OF LACTIC ACID: CPT | Performed by: STUDENT IN AN ORGANIZED HEALTH CARE EDUCATION/TRAINING PROGRAM

## 2023-10-13 PROCEDURE — 86301 IMMUNOASSAY TUMOR CA 19-9: CPT | Performed by: INTERNAL MEDICINE

## 2023-10-13 PROCEDURE — 81001 URINALYSIS AUTO W/SCOPE: CPT | Performed by: STUDENT IN AN ORGANIZED HEALTH CARE EDUCATION/TRAINING PROGRAM

## 2023-10-13 PROCEDURE — 25810000003 SODIUM CHLORIDE 0.9 % SOLUTION: Performed by: NURSE PRACTITIONER

## 2023-10-13 PROCEDURE — 86225 DNA ANTIBODY NATIVE: CPT | Performed by: INTERNAL MEDICINE

## 2023-10-13 PROCEDURE — G0378 HOSPITAL OBSERVATION PER HR: HCPCS

## 2023-10-13 PROCEDURE — 83735 ASSAY OF MAGNESIUM: CPT | Performed by: INTERNAL MEDICINE

## 2023-10-13 PROCEDURE — 84443 ASSAY THYROID STIM HORMONE: CPT | Performed by: INTERNAL MEDICINE

## 2023-10-13 PROCEDURE — 85025 COMPLETE CBC W/AUTO DIFF WBC: CPT | Performed by: STUDENT IN AN ORGANIZED HEALTH CARE EDUCATION/TRAINING PROGRAM

## 2023-10-13 PROCEDURE — 99285 EMERGENCY DEPT VISIT HI MDM: CPT

## 2023-10-13 PROCEDURE — 83036 HEMOGLOBIN GLYCOSYLATED A1C: CPT | Performed by: INTERNAL MEDICINE

## 2023-10-13 PROCEDURE — 25010000002 MORPHINE PER 10 MG: Performed by: STUDENT IN AN ORGANIZED HEALTH CARE EDUCATION/TRAINING PROGRAM

## 2023-10-13 PROCEDURE — 74177 CT ABD & PELVIS W/CONTRAST: CPT

## 2023-10-13 PROCEDURE — 25510000001 IOPAMIDOL 61 % SOLUTION: Performed by: STUDENT IN AN ORGANIZED HEALTH CARE EDUCATION/TRAINING PROGRAM

## 2023-10-13 PROCEDURE — 82010 KETONE BODYS QUAN: CPT | Performed by: NURSE PRACTITIONER

## 2023-10-13 PROCEDURE — 82565 ASSAY OF CREATININE: CPT

## 2023-10-13 RX ORDER — BISACODYL 5 MG/1
5 TABLET, DELAYED RELEASE ORAL DAILY PRN
Status: DISCONTINUED | OUTPATIENT
Start: 2023-10-13 | End: 2023-10-15 | Stop reason: HOSPADM

## 2023-10-13 RX ORDER — INSULIN LISPRO 100 [IU]/ML
1-200 INJECTION, SOLUTION INTRAVENOUS; SUBCUTANEOUS AS NEEDED
Status: DISCONTINUED | OUTPATIENT
Start: 2023-10-13 | End: 2023-10-15 | Stop reason: HOSPADM

## 2023-10-13 RX ORDER — ATORVASTATIN CALCIUM 40 MG/1
80 TABLET, FILM COATED ORAL NIGHTLY
Status: DISCONTINUED | OUTPATIENT
Start: 2023-10-13 | End: 2023-10-15 | Stop reason: HOSPADM

## 2023-10-13 RX ORDER — SODIUM CHLORIDE 0.9 % (FLUSH) 0.9 %
10 SYRINGE (ML) INJECTION AS NEEDED
Status: DISCONTINUED | OUTPATIENT
Start: 2023-10-13 | End: 2023-10-15 | Stop reason: HOSPADM

## 2023-10-13 RX ORDER — INSULIN LISPRO 100 [IU]/ML
1-200 INJECTION, SOLUTION INTRAVENOUS; SUBCUTANEOUS
Status: DISCONTINUED | OUTPATIENT
Start: 2023-10-13 | End: 2023-10-14

## 2023-10-13 RX ORDER — ONDANSETRON 4 MG/1
4 TABLET, FILM COATED ORAL EVERY 8 HOURS PRN
COMMUNITY

## 2023-10-13 RX ORDER — MORPHINE SULFATE 4 MG/ML
4 INJECTION, SOLUTION INTRAMUSCULAR; INTRAVENOUS ONCE
Status: COMPLETED | OUTPATIENT
Start: 2023-10-13 | End: 2023-10-13

## 2023-10-13 RX ORDER — NICOTINE POLACRILEX 4 MG
15 LOZENGE BUCCAL
Status: DISCONTINUED | OUTPATIENT
Start: 2023-10-13 | End: 2023-10-14

## 2023-10-13 RX ORDER — SODIUM CHLORIDE 0.9 % (FLUSH) 0.9 %
10 SYRINGE (ML) INJECTION EVERY 12 HOURS SCHEDULED
Status: DISCONTINUED | OUTPATIENT
Start: 2023-10-13 | End: 2023-10-15 | Stop reason: HOSPADM

## 2023-10-13 RX ORDER — LORAZEPAM 2 MG/ML
2 INJECTION INTRAMUSCULAR
Status: DISCONTINUED | OUTPATIENT
Start: 2023-10-13 | End: 2023-10-14

## 2023-10-13 RX ORDER — LORAZEPAM 1 MG/1
2 TABLET ORAL
Status: DISCONTINUED | OUTPATIENT
Start: 2023-10-13 | End: 2023-10-14

## 2023-10-13 RX ORDER — PANTOPRAZOLE SODIUM 40 MG/10ML
40 INJECTION, POWDER, LYOPHILIZED, FOR SOLUTION INTRAVENOUS
Status: DISCONTINUED | OUTPATIENT
Start: 2023-10-14 | End: 2023-10-14

## 2023-10-13 RX ORDER — GLIPIZIDE 2.5 MG/1
2.5 TABLET, EXTENDED RELEASE ORAL DAILY
Status: ON HOLD | COMMUNITY
End: 2023-10-15 | Stop reason: SDUPTHER

## 2023-10-13 RX ORDER — LORAZEPAM 1 MG/1
1 TABLET ORAL EVERY 6 HOURS
Status: DISCONTINUED | OUTPATIENT
Start: 2023-10-14 | End: 2023-10-14

## 2023-10-13 RX ORDER — AMLODIPINE BESYLATE 2.5 MG/1
2.5 TABLET ORAL DAILY
Status: DISCONTINUED | OUTPATIENT
Start: 2023-10-13 | End: 2023-10-15 | Stop reason: HOSPADM

## 2023-10-13 RX ORDER — MORPHINE SULFATE 2 MG/ML
2 INJECTION, SOLUTION INTRAMUSCULAR; INTRAVENOUS EVERY 4 HOURS PRN
Status: DISCONTINUED | OUTPATIENT
Start: 2023-10-13 | End: 2023-10-15 | Stop reason: HOSPADM

## 2023-10-13 RX ORDER — ALLOPURINOL 100 MG/1
100 TABLET ORAL DAILY
Status: DISCONTINUED | OUTPATIENT
Start: 2023-10-13 | End: 2023-10-15 | Stop reason: HOSPADM

## 2023-10-13 RX ORDER — BISACODYL 10 MG
10 SUPPOSITORY, RECTAL RECTAL DAILY PRN
Status: DISCONTINUED | OUTPATIENT
Start: 2023-10-13 | End: 2023-10-15 | Stop reason: HOSPADM

## 2023-10-13 RX ORDER — ONDANSETRON 2 MG/ML
4 INJECTION INTRAMUSCULAR; INTRAVENOUS EVERY 6 HOURS PRN
Status: DISCONTINUED | OUTPATIENT
Start: 2023-10-13 | End: 2023-10-15 | Stop reason: HOSPADM

## 2023-10-13 RX ORDER — AMOXICILLIN 250 MG
2 CAPSULE ORAL 2 TIMES DAILY
Status: DISCONTINUED | OUTPATIENT
Start: 2023-10-13 | End: 2023-10-15 | Stop reason: HOSPADM

## 2023-10-13 RX ORDER — FOLIC ACID 1 MG/1
1 TABLET ORAL DAILY
Status: DISCONTINUED | OUTPATIENT
Start: 2023-10-13 | End: 2023-10-14

## 2023-10-13 RX ORDER — SODIUM CHLORIDE 9 MG/ML
10 INJECTION INTRAVENOUS AS NEEDED
Status: DISCONTINUED | OUTPATIENT
Start: 2023-10-13 | End: 2023-10-15 | Stop reason: HOSPADM

## 2023-10-13 RX ORDER — POLYETHYLENE GLYCOL 3350 17 G/17G
17 POWDER, FOR SOLUTION ORAL DAILY PRN
Status: DISCONTINUED | OUTPATIENT
Start: 2023-10-13 | End: 2023-10-15 | Stop reason: HOSPADM

## 2023-10-13 RX ORDER — THIAMINE HYDROCHLORIDE 100 MG/ML
200 INJECTION, SOLUTION INTRAMUSCULAR; INTRAVENOUS EVERY 8 HOURS SCHEDULED
Status: DISCONTINUED | OUTPATIENT
Start: 2023-10-13 | End: 2023-10-14 | Stop reason: SDUPTHER

## 2023-10-13 RX ORDER — ASPIRIN 81 MG/1
81 TABLET ORAL DAILY
Status: DISCONTINUED | OUTPATIENT
Start: 2023-10-13 | End: 2023-10-15 | Stop reason: HOSPADM

## 2023-10-13 RX ORDER — LORAZEPAM 2 MG/ML
1 INJECTION INTRAMUSCULAR
Status: DISCONTINUED | OUTPATIENT
Start: 2023-10-13 | End: 2023-10-14

## 2023-10-13 RX ORDER — IBUPROFEN 600 MG/1
1 TABLET ORAL
Status: DISCONTINUED | OUTPATIENT
Start: 2023-10-13 | End: 2023-10-14

## 2023-10-13 RX ORDER — LORAZEPAM 1 MG/1
2 TABLET ORAL EVERY 6 HOURS
Status: DISCONTINUED | OUTPATIENT
Start: 2023-10-13 | End: 2023-10-14

## 2023-10-13 RX ORDER — LORAZEPAM 1 MG/1
1 TABLET ORAL
Status: DISCONTINUED | OUTPATIENT
Start: 2023-10-13 | End: 2023-10-14

## 2023-10-13 RX ORDER — DEXTROSE MONOHYDRATE 25 G/50ML
10-50 INJECTION, SOLUTION INTRAVENOUS
Status: DISCONTINUED | OUTPATIENT
Start: 2023-10-13 | End: 2023-10-15 | Stop reason: HOSPADM

## 2023-10-13 RX ORDER — SODIUM CHLORIDE 9 MG/ML
125 INJECTION, SOLUTION INTRAVENOUS CONTINUOUS
Status: DISCONTINUED | OUTPATIENT
Start: 2023-10-13 | End: 2023-10-14

## 2023-10-13 RX ORDER — FLUCONAZOLE 2 MG/ML
200 INJECTION, SOLUTION INTRAVENOUS EVERY 24 HOURS
Qty: 500 ML | Refills: 0 | Status: DISCONTINUED | OUTPATIENT
Start: 2023-10-13 | End: 2023-10-15 | Stop reason: HOSPADM

## 2023-10-13 RX ORDER — ONDANSETRON 2 MG/ML
4 INJECTION INTRAMUSCULAR; INTRAVENOUS ONCE
Status: COMPLETED | OUTPATIENT
Start: 2023-10-13 | End: 2023-10-13

## 2023-10-13 RX ORDER — SODIUM CHLORIDE 9 MG/ML
40 INJECTION, SOLUTION INTRAVENOUS AS NEEDED
Status: DISCONTINUED | OUTPATIENT
Start: 2023-10-13 | End: 2023-10-15 | Stop reason: HOSPADM

## 2023-10-13 RX ADMIN — IOPAMIDOL 95 ML: 612 INJECTION, SOLUTION INTRAVENOUS at 11:07

## 2023-10-13 RX ADMIN — Medication 10 ML: at 20:29

## 2023-10-13 RX ADMIN — SODIUM CHLORIDE 125 ML/HR: 9 INJECTION, SOLUTION INTRAVENOUS at 18:50

## 2023-10-13 RX ADMIN — INSULIN DETEMIR 15 UNITS: 100 INJECTION, SOLUTION SUBCUTANEOUS at 20:30

## 2023-10-13 RX ADMIN — ALLOPURINOL 100 MG: 100 TABLET ORAL at 18:50

## 2023-10-13 RX ADMIN — LORAZEPAM 2 MG: 1 TABLET ORAL at 20:32

## 2023-10-13 RX ADMIN — INSULIN HUMAN 5 UNITS: 100 INJECTION, SOLUTION PARENTERAL at 14:19

## 2023-10-13 RX ADMIN — INSULIN LISPRO 2 UNITS: 100 INJECTION, SOLUTION INTRAVENOUS; SUBCUTANEOUS at 23:51

## 2023-10-13 RX ADMIN — SODIUM CHLORIDE 1000 ML: 9 INJECTION, SOLUTION INTRAVENOUS at 10:46

## 2023-10-13 RX ADMIN — INSULIN LISPRO 2 UNITS: 100 INJECTION, SOLUTION INTRAVENOUS; SUBCUTANEOUS at 20:30

## 2023-10-13 RX ADMIN — NYSTATIN 500000 UNITS: 100000 SUSPENSION ORAL at 18:50

## 2023-10-13 RX ADMIN — ONDANSETRON 4 MG: 2 INJECTION INTRAMUSCULAR; INTRAVENOUS at 10:51

## 2023-10-13 RX ADMIN — SENNOSIDES AND DOCUSATE SODIUM 2 TABLET: 8.6; 5 TABLET ORAL at 20:32

## 2023-10-13 RX ADMIN — MORPHINE SULFATE 4 MG: 4 INJECTION, SOLUTION INTRAMUSCULAR; INTRAVENOUS at 10:51

## 2023-10-13 RX ADMIN — ATORVASTATIN CALCIUM 80 MG: 40 TABLET, FILM COATED ORAL at 20:32

## 2023-10-13 RX ADMIN — SODIUM CHLORIDE 1000 ML: 9 INJECTION, SOLUTION INTRAVENOUS at 11:53

## 2023-10-13 RX ADMIN — THIAMINE HYDROCHLORIDE 200 MG: 100 INJECTION, SOLUTION INTRAMUSCULAR; INTRAVENOUS at 20:32

## 2023-10-13 RX ADMIN — FLUCONAZOLE 200 MG: 200 INJECTION, SOLUTION INTRAVENOUS at 20:32

## 2023-10-13 RX ADMIN — NYSTATIN 500000 UNITS: 100000 SUSPENSION ORAL at 20:29

## 2023-10-13 RX ADMIN — AMLODIPINE BESYLATE 2.5 MG: 2.5 TABLET ORAL at 18:50

## 2023-10-13 RX ADMIN — FOLIC ACID 1 MG: 1 TABLET ORAL at 20:32

## 2023-10-13 NOTE — CASE MANAGEMENT/SOCIAL WORK
Discharge Planning Assessment  [facility]     Patient Name: Bhaskar Torres  MRN: [id number]  Today's Date: 10/13/2023    Admit Date: 10/13/2023    Plan: IDP   Discharge Needs Assessment       Row Name 10/13/23 1431       Living Environment    People in Home spouse    Current Living Arrangements home    Primary Care Provided by self    Provides Primary Care For no one    Family Caregiver if Needed spouse    Quality of Family Relationships helpful;involved    Able to Return to Prior Arrangements yes       Transition Planning    Patient/Family Anticipates Transition to home with family    Transportation Anticipated family or friend will provide       Discharge Needs Assessment    Readmission Within the Last 30 Days no previous admission in last 30 days    Equipment Currently Used at Home none    Concerns to be Addressed denies needs/concerns at this time                   Discharge Plan       Row Name 10/13/23 1432       Plan    Plan IDP    Plan Comments MSW met with Pt and wife at bedside to complete IDP. [place] Pt confirmed demographics and reports PCP is Dr. Trivedi. Pt has Medicare insurance coverage. Pt independent with ADLs; Pt reports no DME, HH, or O2. Pt’s preferred pharmacy is [facility]. Pt’s wife able to transport when medically ready. Pt denied needs or concerns. CM will continue to follow and assist with discharge planning.    Final Discharge Disposition Code 30 - still a patient                  Continued Care and Services - Admitted Since 10/13/2023    Coordination has not been started for this encounter.          Demographic Summary       Row Name 10/13/23 1430       General Information    Arrived From home    Referral Source admission list;emergency department    Reason for Consult discharge planning    Preferred Language English                   Functional Status       Row Name 10/13/23 1430       Functional Status    Usual Activity Tolerance good    Current  Activity Tolerance good       Functional Status, IADL    Medications independent    Meal Preparation independent    Housekeeping independent    Laundry independent    Shopping independent                   Psychosocial    No documentation.                              MIL Brar

## 2023-10-13 NOTE — ED NOTES
Bhaskar Torres    Nursing Report ED to Floor:  Mental status: A/O x4  Ambulatory status: at will  Oxygen Therapy:  RA  Cardiac Rhythm: NSR  Admitted from: Home  Safety Concerns:  none  Social Issues: unknown  ED Room #:  22    ED Nurse Phone Extension - [phone] or may call [phone].      HPI:   Chief Complaint   Patient presents with    Abdominal Pain       Past Medical History:  Past Medical History:   Diagnosis Date    Arthritis     Asthma     Bronchitis     Diabetes mellitus     Gout     Hypertension     Kidney stones     Prostate cancer         Past Surgical History:  Past Surgical History:   Procedure Laterality Date    ADENOIDECTOMY      HERNIA REPAIR      PROSTATECTOMY      THYROID SURGERY Left     TONSILLECTOMY          Admitting Doctor:   Precious West MD    Consulting Provider(s):  Consults       No orders found from 9/14/2023 to 10/14/2023.             Admitting Diagnosis:   There were no encounter diagnoses.    Most Recent Vitals:   Vitals:    10/13/23 1200 10/13/23 1202 10/13/23 1232 10/13/23 1302   BP:  153/90 157/96 139/82   Pulse: 57 57 60 58   Resp:       Temp:       TempSrc:       SpO2: 96% 97% 95% 97%   Weight:       Height:           Active LDAs/IV Access:   Lines, Drains & Airways       Active LDAs       Name Placement date Placement time Site Days    Peripheral IV 10/13/23 1035 Right Antecubital 10/13/23  1035  Antecubital  less than 1                    Labs (abnormal labs have a star):   Labs Reviewed   COMPREHENSIVE METABOLIC PANEL - Abnormal; Notable for the following components:       Result Value    Glucose 447 (*)     BUN 34 (*)     Creatinine 1.29 (*)     CO2 30.0 (*)     ALT (SGPT) 44 (*)     Total Bilirubin 1.3 (*)     BUN/Creatinine Ratio 26.4 (*)     All other components within normal limits    Narrative:     GFR Normal >60  Chronic Kidney Disease <60  Kidney Failure <15     URINALYSIS W/ MICROSCOPIC IF INDICATED (NO CULTURE) - Abnormal; Notable for the following components:     Appearance, UA Cloudy (*)     Specific Gravity, UA 1.041 (*)     Glucose, UA >=1000 mg/dL (3+) (*)     Ketones, UA 15 mg/dL (1+) (*)     Blood, UA Small (1+) (*)     All other components within normal limits   CBC WITH AUTO DIFFERENTIAL - Abnormal; Notable for the following components:    Neutrophil % 83.5 (*)     Lymphocyte % 6.5 (*)     Immature Grans % 1.3 (*)     Lymphocytes, Absolute 0.54 (*)     Immature Grans, Absolute 0.11 (*)     All other components within normal limits   BETA HYDROXYBUTYRATE QUANTITATIVE - Abnormal; Notable for the following components:    Beta-Hydroxybutyrate Quant 0.717 (*)     All other components within normal limits    Narrative:     In the assessment of possible diabetic ketoacidosis, the test should be interpreted along with other clinical and laboratory findings.  A level greater than 1 mmol/L should require further evaluation and levels of more than 3 mmol/L require immediate medical review.   URINALYSIS, MICROSCOPIC ONLY - Abnormal; Notable for the following components:    RBC, UA 11-20 (*)     WBC, UA 3-5 (*)     All other components within normal limits   POCT GLUCOSE FINGERSTICK - Abnormal; Notable for the following components:    Glucose 318 (*)     All other components within normal limits   LIPASE - Normal   LACTIC ACID, PLASMA - Normal   POCT CREATININE - Normal   POCT CREATININE - Normal   RAINBOW DRAW    Narrative:     The following orders were created for panel order [place] Draw.  Procedure                               Abnormality         Status                     ---------                               -----------         ------                     Green Top (Gel)[[id number]]                                  Final result               Lavender Top[[id number]]                                     Final result               Gold Top - [facility][[id number]]                                   Final result               [place] Top[[id number]]                                          In process                 Light Blue Top[[id number]]                                   Final result                 Please view results for these tests on the individual orders.   HEMOGLOBIN A1C   PROCALCITONIN   POCT GLUCOSE FINGERSTICK   CBC AND DIFFERENTIAL    Narrative:     The following orders were created for panel order CBC & Differential.  Procedure                               Abnormality         Status                     ---------                               -----------         ------                     CBC Auto Differential[[id number]]        Abnormal            Final result                 Please view results for these tests on the individual orders.   GREEN TOP   LAVENDER TOP   GOLD TOP - SST   LIGHT BLUE TOP   GRAY TOP       Meds Given in ED:   Medications   Sodium Chloride (PF) 0.9 % 10 mL (has no administration in time range)   sodium chloride 0.9 % flush 10 mL (has no administration in time range)   sodium chloride 0.9 % bolus 1,000 mL (0 mL Intravenous Stopped 10/13/23 1152)   Morphine sulfate (PF) injection 4 mg (4 mg Intravenous Given 10/13/23 1051)   ondansetron (ZOFRAN) injection 4 mg (4 mg Intravenous Given 10/13/23 1051)   iopamidol (ISOVUE-300) 61 % injection 100 mL (95 mL Intravenous Given 10/13/23 1107)   sodium chloride 0.9 % bolus 1,000 mL (0 mL Intravenous Stopped 10/13/23 1422)   insulin regular (humuLIN R,novoLIN R) injection 5 Units (5 Units Intravenous Given 10/13/23 1419)

## 2023-10-13 NOTE — Clinical Note
Level of Care: Telemetry [5]   Diagnosis: Type 2 diabetes mellitus with hyperglycemia [523155]   Admitting Physician: WILBER PASCAL [[id number]]   Attending Physician: WILBER PASCAL [[id number]]

## 2023-10-13 NOTE — ED PROVIDER NOTES
EMERGENCY DEPARTMENT ENCOUNTER    Pt Name: Bhaskar Torres  MRN: [id number]  Pt :   1955  Room Number:    Date of encounter:  10/13/2023  PCP: Ramesh Trivedi MD  ED Provider: TOMASZ Abreu    Historian: Patient    HPI:  Chief Complaint: Abdominal pain.    Context: Bhaskar Torres is a 68 y.o. male who presents to the ED c/o abdominal pain.  Patient reports that he is a diabetic.  He reports that he has had a decrease in intake over the past 3 days.  He reports that he has felt dehydrated over the past couple weeks.  He advises 1 minute he is not eating or drinking in the next minute he is excessively drinking.  He denies nausea, vomiting, diarrhea.  Last bowel movement is unknown.  Negative for fever, chills.  HPI     REVIEW OF SYSTEMS  A chief complaint appropriate review of systems was completed and is negative except as noted in the HPI.     PAST MEDICAL HISTORY  Past Medical History:   Diagnosis Date    Arthritis     Asthma     Bronchitis     Diabetes mellitus     Gout     Hypertension     Kidney stones     Prostate cancer        PAST SURGICAL HISTORY  Past Surgical History:   Procedure Laterality Date    ADENOIDECTOMY      HERNIA REPAIR      PROSTATECTOMY      THYROID SURGERY Left     TONSILLECTOMY         FAMILY HISTORY  Family History   Problem Relation Age of Onset    Heart disease Father     Pancreatic cancer Maternal Grandfather 72       SOCIAL HISTORY  Social History     Socioeconomic History    Marital status: Unknown   Tobacco Use    Smoking status: Never    Smokeless tobacco: Never   Substance and Sexual Activity    Alcohol use: Yes     Comment: 35oz per week    Drug use: Never       ALLERGIES  Amoxicillin    PHYSICAL EXAM  Physical Exam  Vitals and nursing note reviewed.   Constitutional:       General: He is not in acute distress.     Appearance: He is well-developed. He is not ill-appearing or toxic-appearing.   HENT:      Head: Normocephalic and atraumatic.       Mouth/Throat:      Mouth: Mucous membranes are moist.   Eyes:      Extraocular Movements: Extraocular movements intact.      Pupils: Pupils are equal, round, and reactive to light.   Cardiovascular:      Rate and Rhythm: Normal rate and regular rhythm.      Heart sounds: Normal heart sounds.   Pulmonary:      Effort: Pulmonary effort is normal. No respiratory distress.      Breath sounds: Normal breath sounds.   Abdominal:      General: Bowel sounds are normal.      Palpations: Abdomen is soft.      Tenderness: There is abdominal tenderness.   Skin:     General: Skin is warm and dry.   Neurological:      General: No focal deficit present.      Mental Status: He is alert.   Psychiatric:         Behavior: Behavior normal.           LAB RESULTS  Results for orders placed or performed during the hospital encounter of 10/13/23   Comprehensive Metabolic Panel    Specimen: Blood   Result Value Ref Range    Glucose 447 (C) 65 - 99 mg/dL    BUN 34 (H) 8 - 23 mg/dL    Creatinine 1.29 (H) 0.76 - 1.27 mg/dL    Sodium 140 136 - 145 mmol/L    Potassium 4.3 3.5 - 5.2 mmol/L    Chloride 100 98 - 107 mmol/L    CO2 30.0 (H) 22.0 - 29.0 mmol/L    Calcium 9.7 8.6 - 10.5 mg/dL    Total Protein 6.4 6.0 - 8.5 g/dL    Albumin 4.3 3.5 - 5.2 g/dL    ALT (SGPT) 44 (H) 1 - 41 U/L    AST (SGOT) 19 1 - 40 U/L    Alkaline Phosphatase 105 39 - 117 U/L    Total Bilirubin 1.3 (H) 0.0 - 1.2 mg/dL    Globulin 2.1 gm/dL    A/G Ratio 2.0 g/dL    BUN/Creatinine Ratio 26.4 (H) 7.0 - 25.0    Anion Gap 10.0 5.0 - 15.0 mmol/L    eGFR 60.4 >60.0 mL/min/1.73   Lipase    Specimen: Blood   Result Value Ref Range    Lipase 36 13 - 60 U/L   Urinalysis With Microscopic If Indicated (No Culture) - Urine, Clean Catch    Specimen: Urine, Clean Catch   Result Value Ref Range    Color, UA Yellow Yellow, Straw    Appearance, UA Cloudy (A) Clear    pH, UA 5.5 5.0 - 8.0    Specific Gravity, UA 1.041 (H) 1.001 - 1.030    Glucose, UA >=1000 mg/dL (3+) (A) Negative     Ketones, UA 15 mg/dL (1+) (A) Negative    Bilirubin, UA Negative Negative    Blood, UA Small (1+) (A) Negative    Protein, UA Negative Negative    Leuk Esterase, UA Negative Negative    Nitrite, UA Negative Negative    Urobilinogen, UA 1.0 E.U./dL 0.2 - 1.0 E.U./dL   Lactic Acid, Plasma    Specimen: Blood   Result Value Ref Range    Lactate 1.7 0.5 - 2.0 mmol/L   CBC Auto Differential    Specimen: Blood   Result Value Ref Range    WBC 8.35 3.40 - 10.80 10*3/mm3    RBC 4.72 4.14 - 5.80 10*6/mm3    Hemoglobin 15.4 13.0 - 17.7 g/dL    Hematocrit 43.6 37.5 - 51.0 %    MCV 92.4 79.0 - 97.0 fL    MCH 32.6 26.6 - 33.0 pg    MCHC 35.3 31.5 - 35.7 g/dL    RDW 13.5 12.3 - 15.4 %    RDW-SD 45.1 37.0 - 54.0 fl    MPV 10.2 6.0 - 12.0 fL    Platelets 157 140 - 450 10*3/mm3    Neutrophil % 83.5 (H) 42.7 - 76.0 %    Lymphocyte % 6.5 (L) 19.6 - 45.3 %    Monocyte % 7.9 5.0 - 12.0 %    Eosinophil % 0.6 0.3 - 6.2 %    Basophil % 0.2 0.0 - 1.5 %    Immature Grans % 1.3 (H) 0.0 - 0.5 %    Neutrophils, Absolute 6.97 1.70 - 7.00 10*3/mm3    Lymphocytes, Absolute 0.54 (L) 0.70 - 3.10 10*3/mm3    Monocytes, Absolute 0.66 0.10 - 0.90 10*3/mm3    Eosinophils, Absolute 0.05 0.00 - 0.40 10*3/mm3    Basophils, Absolute 0.02 0.00 - 0.20 10*3/mm3    Immature Grans, Absolute 0.11 (H) 0.00 - 0.05 10*3/mm3    nRBC 0.0 0.0 - 0.2 /100 WBC   Beta Hydroxybutyrate Quantitative    Specimen: Blood   Result Value Ref Range    Beta-Hydroxybutyrate Quant 0.717 (H) 0.020 - 0.270 mmol/L   Urinalysis, Microscopic Only - Urine, Clean Catch    Specimen: Urine, Clean Catch   Result Value Ref Range    RBC, UA 11-20 (A) None Seen, 0-2 /HPF    WBC, UA 3-5 (A) None Seen, 0-2 /HPF    Bacteria, UA None Seen None Seen, Trace /HPF    Squamous Epithelial Cells, UA 0-2 None Seen, 0-2 /HPF    Hyaline Casts, UA 0-6 0 - 6 /LPF    Methodology Automated Microscopy    Hemoglobin A1c    Specimen: Blood   Result Value Ref Range    Hemoglobin A1C 11.30 (H) 4.80 - 5.60 %    Procalcitonin    Specimen: Blood   Result Value Ref Range    Procalcitonin 0.10 0.00 - 0.25 ng/mL   POC Creatinine    Specimen: Blood   Result Value Ref Range    Creatinine 1.30 mg/dL   POC Creatinine    Specimen: Blood   Result Value Ref Range    Creatinine 1.30 0.60 - 1.30 mg/dL   POC Glucose Once    Specimen: Blood   Result Value Ref Range    Glucose 318 (H) 70 - 130 mg/dL   Green Top (Gel)   Result Value Ref Range    Extra Tube Hold for add-ons.    Lavender Top   Result Value Ref Range    Extra Tube hold for add-on    Gold Top - SST   Result Value Ref Range    Extra Tube Hold for add-ons.    Gray Top   Result Value Ref Range    Extra Tube Hold for add-ons.    Light Blue Top   Result Value Ref Range    Extra Tube Hold for add-ons.        If labs were ordered, I independently reviewed the results and considered them in treating the patient.    RADIOLOGY  CT Abdomen Pelvis With Contrast   Final Result   Impression:      1. Diverticulosis.      2. 8 mm nodule at the right lung base. Recommend a follow-up noncontrast CT thorax to serve as a baseline exam.            Electronically Signed: Sandra Joy MD     10/13/2023 10:20 AM ReedsyT     Workstation ID: [id number]        [x] Radiologist's Report Reviewed:  I ordered and independently reviewed the above noted radiographic studies.  See radiologist's dictation for official interpretation.      PROCEDURES    Procedures    No orders to display       MEDICATIONS GIVEN IN ER    Medications   Sodium Chloride (PF) 0.9 % 10 mL (has no administration in time range)   sodium chloride 0.9 % flush 10 mL (has no administration in time range)   sodium chloride 0.9 % infusion (has no administration in time range)   pantoprazole (PROTONIX) injection 40 mg (has no administration in time range)   fluconazole (DIFLUCAN) IVPB 200 mg (has no administration in time range)   nystatin (MYCOSTATIN) 100,000 unit/mL suspension 500,000 Units (has no administration in time range)   sodium  chloride 0.9 % bolus 1,000 mL (0 mL Intravenous Stopped 10/13/23 1152)   Morphine sulfate (PF) injection 4 mg (4 mg Intravenous Given 10/13/23 1051)   ondansetron (ZOFRAN) injection 4 mg (4 mg Intravenous Given 10/13/23 1051)   iopamidol (ISOVUE-300) 61 % injection 100 mL (95 mL Intravenous Given 10/13/23 1107)   sodium chloride 0.9 % bolus 1,000 mL (0 mL Intravenous Stopped 10/13/23 1422)   insulin regular (humuLIN R,novoLIN R) injection 5 Units (5 Units Intravenous Given 10/13/23 1419)       MEDICAL DECISION MAKING, PROGRESS, and CONSULTS   Medical Decision Making  Bhaskra Torres is a 68 y.o. male who presents to the ED c/o abdominal pain.  Patient reports that he is a diabetic.  He reports that he has had a decrease in intake over the past 3 days.  He reports that he has felt dehydrated over the past couple weeks.  He advises 1 minute he is not eating or drinking in the next minute he is excessively drinking.  He denies nausea, vomiting, diarrhea.  Last bowel movement is unknown.  Negative for fever, chills.      Problems Addressed:  Dehydration: acute illness or injury     Details: 2 L IV fluids infused  Epigastric pain: acute illness or injury     Details: CT abdomen and pelvis obtained revealed no acute findings.  Fatigue, unspecified type: acute illness or injury  Type 2 diabetes mellitus with hyperglycemia, without long-term current use of insulin: chronic illness or injury     Details: Patient's blood sugars not being managed with his metformin and glipizide.  We will admit patient for further treatment and evaluation.    Amount and/or Complexity of Data Reviewed  Labs: ordered. Decision-making details documented in ED Course.  Radiology: ordered. Decision-making details documented in ED Course.    Risk  Prescription drug management.  Decision regarding hospitalization.        All labs have been independently reviewed by me.  All radiology studies have been reviewed by me and the radiologist dictating the  report.  All EKG's have been independently viewed by me.    [] Discussed with radiology regarding test interpretation:    Discussion below represents my analysis of pertinent findings related to patient's condition, differential diagnosis, treatment plan and final disposition.    Differential diagnosis:  The differential diagnosis associated with the patient's presentation includes: hyperglycemia, dehydration, small bowel obstruction, constipation, electrolyte imbalance, gastroparesis, IBS, UTI    additional sources  Discussed/ obtained information from independent historians:   [x] Spouse  [] Parent  [] Family member  [] Friend  [] EMS   [] Other:  External (non-ED) record review:   [x] Inpatient record:   [] Office record:   [x] Outpatient record:   [x] Prior Outpatient labs:   [x] Prior Outpatient radiology:   [] Primary Care record:   [] Outside ED record:   [] Other:   Patient's care impacted by:   [x] Diabetes  [x] Hypertension  [] Hyperlipidemia  [] Hypothyroidism   [] Coronary Artery Disease   [] COPD   [] Cancer   [] Obesity  [] GERD   [] Tobacco Abuse   [] Substance Abuse    [] Anxiety   [] Depression   [] Other:   Care significantly affected by Social Determinants of Health (housing and economic circumstances, unemployment)    [] Yes     [x] No   If yes, Patient's care significantly limited by  Social Determinants of Health including:   [] Inadequate housing   [] Low income   [] Alcoholism and drug addiction in family   [] Problems related to primary support group   [] Unemployment   [] Problems related to employment   [] Other Social Determinants of Health:     Shared decision making: Shared decision making with patient.  Patient advises that he does not feel that he can go home.  Patient feels that he is good to get worse and die.  He feels that he is unable to manage his sugar at home.  He is unable to eat and drink due to his uncontrolled diabetes and discomfort when he does so.  I have spoken with  Dr. Ma my attending he suggest admission.  I spoke with Dr. West.  She agrees to admit the patient.    Orders placed during this visit:  Orders Placed This Encounter   Procedures    CT Abdomen Pelvis With Contrast    [place] Draw    Comprehensive Metabolic Panel    Lipase    Urinalysis With Microscopic If Indicated (No Culture) - Urine, Clean Catch    Lactic Acid, Plasma    CBC Auto Differential    Beta Hydroxybutyrate Quantitative    Urinalysis, Microscopic Only - Urine, Clean Catch    Hemoglobin A1c    Procalcitonin    NPO Diet NPO Type: Sips with Meds, Ice Chips    Undress & Gown    Place Sequential Compression Device    Maintain Sequential Compression Device    Code Status and Medical Interventions:    POC Creatinine    POC Creatinine    POC Glucose STAT    POC Glucose Once    Insert Peripheral IV    Insert Peripheral IV    Initiate Observation Status    ED IP Bed Request    CBC & Differential    Green Top (Gel)    Lavender Top    Gold Top - SST    Gray Top    Light Blue Top       I considered prescription management  with:   [] Pain medication  [] Antiviral  [] Antibiotic   [] Other:   Rationale:  Additional orders considered but not ordered:  The following testing was considered but ultimately not selected after discussion with patient/family:    ED Course:    ED Course as of 10/13/23 1448   Fri Oct 13, 2023   1127 Glucose(!!): 447 [KG]   1132 Beta-Hydroxybutyrate Quant(!): 0.717 [KG]   1132 Anion Gap: 10.0 [KG]   1327 Glucose(!): 318 [KG]      ED Course User Index  [KG] Maddi Villarreal, TOMASZ            DIAGNOSIS  Final diagnoses:   Type 2 diabetes mellitus with hyperglycemia, without long-term current use of insulin   Dehydration   Epigastric pain   Fatigue, unspecified type       DISPOSITION    .    ED Disposition       ED Disposition   Decision to Admit    Condition   --    Comment   Level of Care: Telemetry [5]   Diagnosis: Type 2 diabetes mellitus with hyperglycemia [592184]   Admitting Physician:  WILBER PASCAL [[id number]]   Attending Physician: WILBER PASCAL [[id number]]                 Please note that portions of this document were completed with voice recognition software.       Maddi Villarreal, APRN  10/13/23 1441

## 2023-10-13 NOTE — H&P
[facility] Medicine Services  HISTORY AND PHYSICAL    Patient Name: Bhaskar Torres  : 1955  MRN: [id number]  Primary Care Physician: Ramehs Trivedi MD    Subjective   Subjective     Chief Complaint: Difficulty swallowing, abdominal pain    HPI:  Bhaskar Torres is a 68 y.o. male who  has a past medical history of Arthritis, Asthma, Bronchitis, Diabetes mellitus, Gout, Hypertension, Kidney stones, and Prostate cancer. who presented with inability to eat or drink for 3 to 4 days, patient reports has been unable to drink water since he was diagnosed with diabetes when he was 52 years old.  Over the last few days has had increasing upper abdominal pain.  He has been unable to eat or drink.  He is wife reports that he is lost about 5 pounds a day.  He does report prior to that he had polyuria.  He admits that he is not very good at keeping track of his sugar.  He denies any fever chills or sweats.  He denies any bleeding, melena.        Review of Systems   Patient denies headaches, changes in vision, fever, chills, sore throat, shortness of breath, cough, vomiting, diarrhea, joint pain, rash, itching, numbness/tingling, weakness or bleeding.    Otherwise complete ROS is negative except as mentioned in the HPI.    Personal History       Oncology Problem List:  Primary cancer of unknown site (10/12/2021; Status: Active)  Oncology/Hematology History     PMH: He  has a past medical history of Arthritis, Asthma, Bronchitis, Diabetes mellitus, Gout, Hypertension, Kidney stones, and Prostate cancer.   PSxH: He  has a past surgical history that includes Tonsillectomy; Adenoidectomy; Hernia repair; Thyroid surgery (Left); and Prostatectomy.         FH: His family history includes Heart disease in his father; Pancreatic cancer (age of onset: 72) in his maternal grandfather.   SH: He  reports that he has never smoked. He has never used smokeless tobacco. He reports current alcohol use. He  reports that he does not use drugs.     Medications:  Flinstones Gummies Omega-3 DHA, Potassium, Vitamin D, allopurinol, amLODIPine, aspirin, atorvastatin, loratadine, metFORMIN, metoprolol succinate XL, nystatin, and ondansetron    Allergies   Allergen Reactions    Amoxicillin Other (See Comments)     Causes hair to fall out       Objective   Objective     Vital Signs:   Temp:  [97.4 °F (36.3 °C)] 97.4 °F (36.3 °C)  Heart Rate:  [57-63] 58  Resp:  [18] 18  BP: (129-157)/(82-99) 139/82  Flow (L/min):  [2] 2    Constitutional: Awake, alert, interactive and pleasant  Eyes: clear sclerae, no conjunctival injection  HENT: NCAT, mucous membranes, dry, buccal mucosa palate soft palate are covered with a white thick plaque  Neck: no masses or lymphadenopathy, trachea midline  Respiratory: good breath sounds bilaterally, respirations unlabored  Cardiovascular: RRR, no murmurs appreciated, palpable peripheral pulses  Abdomen:  soft, no HSM or masses palpable, not tender or distended, thin  Musculoskeletal: No peripheral edema, clubbing or cyanosis  Neurologic: Oriented x 3,                       Strength symmetric in all extremities                     Cranial Nerves grossly intact, speech clear  Skin: No rashes or jaundice  Psychiatric: Appropriate mood, insight      Result Review:  I have personally reviewed the results from the time of this admission   to 10/13/2023 15:11 EDT and agree with these findings:  [x]  Laboratory  [x]  Microbiology  [x]  Radiology  []  EKG/Telemetry   []  Cardiology/Vascular   []  Pathology  [x]  Old records  []  Other:  Most notable findings include: Normal CBC, normal electrolytes of for glucose 447, A1c of 11.3 CT scan reviewed revealed renal cyst, sclerotic change in his iliac bone      LAB RESULTS:      Lab 10/13/23  1035   WBC 8.35   HEMOGLOBIN 15.4   HEMATOCRIT 43.6   PLATELETS 157   NEUTROS ABS 6.97   IMMATURE GRANS (ABS) 0.11*   LYMPHS ABS 0.54*   MONOS ABS 0.66   EOS ABS 0.05   MCV  "92.4   PROCALCITONIN 0.10   LACTATE 1.7         Lab 10/13/23  1043 10/13/23  1041 10/13/23  1035   SODIUM  --   --  140   POTASSIUM  --   --  4.3   CHLORIDE  --   --  100   CO2  --   --  30.0*   ANION GAP  --   --  10.0   BUN  --   --  34*   CREATININE 1.30 1.30 1.29*   EGFR  --   --  60.4   GLUCOSE  --   --  447*   CALCIUM  --   --  9.7   HEMOGLOBIN A1C  --   --  11.30*         Lab 10/13/23  1035   TOTAL PROTEIN 6.4   ALBUMIN 4.3   GLOBULIN 2.1   ALT (SGPT) 44*   AST (SGOT) 19   BILIRUBIN 1.3*   ALK PHOS 105   LIPASE 36                     Brief Urine Lab Results  (Last result in the past 365 days)        Color   Clarity   Blood   Leuk Est   Nitrite   Protein   CREAT   Urine HCG        10/13/23 1046 Yellow   Cloudy   Small (1+)   Negative   Negative   Negative                 No results found for: \"COVID19\"    CT Abdomen Pelvis With Contrast    Result Date: 10/13/2023  CT ABDOMEN PELVIS W CONTRAST Date of Exam: 10/13/2023 9:49 AM CDT Indication: abd pain, decrease intake. constipation, r/o obstruction.. Comparison: None available. Technique: Axial CT images were obtained of the abdomen and pelvis following the uneventful intravenous administration of Isovue. Reconstructed coronal and sagittal images were also obtained. Automated exposure control and iterative construction methods were used. Exam is limited by patient motion artifact. Findings: LUNG BASES: There is an 8 mm nodule at the right lung base (image 11 of series 5). LIVER: There are scattered low attenuating lesions throughout the liver are subcentimeter in size too small to characterize. BILIARY/GALLBLADDER:  Unremarkable SPLEEN:  Unremarkable PANCREAS:  Unremarkable ADRENAL:  Unremarkable KIDNEYS:  Unremarkable parenchyma with no solid mass identified. No obstruction.  The right kidney contains an exophytic 7.7 cm cyst. No follow-up imaging necessary. A cyst is present in the lower pole of the left kidney measuring 1.2 cm. No follow-up necessary. " GASTROINTESTINAL/MESENTERY: Evaluation of the gastrointestinal tract demonstrates diverticulosis without CT evidence for acute diverticulitis. The appendix is not definitively identified. MESENTERIC VESSELS:  Patent. AORTA/IVC:  Normal caliber. RETROPERITONEUM/LYMPH NODES:  Unremarkable REPRODUCTIVE:  Unremarkable BLADDER:  Unremarkable OSSEUS STRUCTURES: There are scattered vertebral body hemangiomata. Sclerotic densities are present in the posterior left iliac bone.     Impression: Impression: 1. Diverticulosis. 2. 8 mm nodule at the right lung base. Recommend a follow-up noncontrast CT thorax to serve as a baseline exam. Electronically Signed: Sandra Joy MD  10/13/2023 10:20 AM CDT  Workstation ID: [id number]         The patient has a COVID HM Topic on their chart, and they are fully vaccinated.    Assessment & Plan   Assessment / Plan       Type 2 diabetes mellitus with hyperglycemia    Oral candidiasis      Assessment & Plan:  68-year-old with 16 years of diabetes who presents with dehydration, gout, prostat cancerabdominal pain, hisotry of prostate cancerhyperglycemia, oral candidiasis    Hyperglycemia with type 2 diabetes  Continue aggressive IV hydration  Start Glucomander subcu frequently, status post 5 units of regular insulin in the emergency room   check electrolytes and replete as needed  Patient needs serious diabetic education who have been consulted    Upper abdominal pain  Oral candidiasis (Possible esophagitis)  Unable to tolerate oral medication, will give Diflucan 200 mg IV daily  Also check ANAs for possible active tissue disorders, such as Sjogren's  PPI  Worried about family history of pancreatic cancer, Ca-19-9 ordered.    Dehydration with PASCALE  Continue IV fluids monitor labs    Renal cyst  Benign per radiology    Sclerotic bone density  Malignancy ruled out 11/21 by Dr Becerra    Patient reported to nursing heaving daily alcohol intake  -CIWA protocol initiated.     DVT  prophylaxis:  Mechanical DVT prophylaxis orders are present.    CODE STATUS:  Code Status (Patient has no pulse and is not breathing): CPR (Attempt to Resuscitate)  Medical Interventions (Patient has pulse or is breathing): Full    Expected Discharge  Expected discharge date/ time has not been documented.    Electronically signed by Precious West MD 10/13/23 15:11 EDT

## 2023-10-14 ENCOUNTER — APPOINTMENT (OUTPATIENT)
Dept: GENERAL RADIOLOGY | Facility: HOSPITAL | Age: 68
End: 2023-10-14
Payer: MEDICARE

## 2023-10-14 ENCOUNTER — ANESTHESIA EVENT (OUTPATIENT)
Dept: GASTROENTEROLOGY | Facility: HOSPITAL | Age: 68
End: 2023-10-14
Payer: MEDICARE

## 2023-10-14 ENCOUNTER — ANESTHESIA (OUTPATIENT)
Dept: GASTROENTEROLOGY | Facility: HOSPITAL | Age: 68
End: 2023-10-14
Payer: MEDICARE

## 2023-10-14 LAB
ANION GAP SERPL CALCULATED.3IONS-SCNC: 6 MMOL/L (ref 5–15)
BUN SERPL-MCNC: 23 MG/DL (ref 8–23)
BUN/CREAT SERPL: 25.6 (ref 7–25)
CALCIUM SPEC-SCNC: 8.8 MG/DL (ref 8.6–10.5)
CANCER AG19-9 SERPL-ACNC: 86.5 U/ML
CHLORIDE SERPL-SCNC: 111 MMOL/L (ref 98–107)
CHOLEST SERPL-MCNC: 147 MG/DL (ref 0–200)
CO2 SERPL-SCNC: 30 MMOL/L (ref 22–29)
CREAT SERPL-MCNC: 0.9 MG/DL (ref 0.76–1.27)
EGFRCR SERPLBLD CKD-EPI 2021: 93 ML/MIN/1.73
GLUCOSE BLDC GLUCOMTR-MCNC: 106 MG/DL (ref 70–130)
GLUCOSE BLDC GLUCOMTR-MCNC: 114 MG/DL (ref 70–130)
GLUCOSE BLDC GLUCOMTR-MCNC: 133 MG/DL (ref 70–130)
GLUCOSE BLDC GLUCOMTR-MCNC: 134 MG/DL (ref 70–130)
GLUCOSE BLDC GLUCOMTR-MCNC: 159 MG/DL (ref 70–130)
GLUCOSE BLDC GLUCOMTR-MCNC: 179 MG/DL (ref 70–130)
GLUCOSE SERPL-MCNC: 140 MG/DL (ref 65–99)
HDLC SERPL-MCNC: 36 MG/DL (ref 40–60)
LDLC SERPL CALC-MCNC: 82 MG/DL (ref 0–100)
LDLC/HDLC SERPL: 2.16 {RATIO}
MAGNESIUM SERPL-MCNC: 1.9 MG/DL (ref 1.6–2.4)
PHOSPHATE SERPL-MCNC: 2.4 MG/DL (ref 2.5–4.5)
POTASSIUM SERPL-SCNC: 4.2 MMOL/L (ref 3.5–5.2)
SODIUM SERPL-SCNC: 147 MMOL/L (ref 136–145)
TRIGL SERPL-MCNC: 167 MG/DL (ref 0–150)
VLDLC SERPL-MCNC: 29 MG/DL (ref 5–40)

## 2023-10-14 PROCEDURE — 63710000001 PANTOPRAZOLE 40 MG TABLET DELAYED-RELEASE: Performed by: INTERNAL MEDICINE

## 2023-10-14 PROCEDURE — 63710000001 POLYETHYLENE GLYCOL 17 G PACK: Performed by: INTERNAL MEDICINE

## 2023-10-14 PROCEDURE — A9270 NON-COVERED ITEM OR SERVICE: HCPCS | Performed by: INTERNAL MEDICINE

## 2023-10-14 PROCEDURE — 82948 REAGENT STRIP/BLOOD GLUCOSE: CPT

## 2023-10-14 PROCEDURE — 88305 TISSUE EXAM BY PATHOLOGIST: CPT | Performed by: INTERNAL MEDICINE

## 2023-10-14 PROCEDURE — 43239 EGD BIOPSY SINGLE/MULTIPLE: CPT | Performed by: INTERNAL MEDICINE

## 2023-10-14 PROCEDURE — 96376 TX/PRO/DX INJ SAME DRUG ADON: CPT

## 2023-10-14 PROCEDURE — G0378 HOSPITAL OBSERVATION PER HR: HCPCS

## 2023-10-14 PROCEDURE — 25810000003 LACTATED RINGERS PER 1000 ML: Performed by: NURSE ANESTHETIST, CERTIFIED REGISTERED

## 2023-10-14 PROCEDURE — 25010000002 PROPOFOL 10 MG/ML EMULSION: Performed by: NURSE ANESTHETIST, CERTIFIED REGISTERED

## 2023-10-14 PROCEDURE — 63710000001 SENNOSIDES-DOCUSATE 8.6-50 MG TABLET: Performed by: INTERNAL MEDICINE

## 2023-10-14 PROCEDURE — 63710000001 ATORVASTATIN 40 MG TABLET: Performed by: INTERNAL MEDICINE

## 2023-10-14 PROCEDURE — 63710000001 INSULIN LISPRO (HUMAN) PER 5 UNITS: Performed by: INTERNAL MEDICINE

## 2023-10-14 PROCEDURE — 80061 LIPID PANEL: CPT | Performed by: INTERNAL MEDICINE

## 2023-10-14 PROCEDURE — 25810000003 SODIUM CHLORIDE 0.9 % SOLUTION: Performed by: INTERNAL MEDICINE

## 2023-10-14 PROCEDURE — 96375 TX/PRO/DX INJ NEW DRUG ADDON: CPT

## 2023-10-14 PROCEDURE — 71045 X-RAY EXAM CHEST 1 VIEW: CPT

## 2023-10-14 PROCEDURE — 63710000001 INSULIN DETEMIR PER 5 UNITS: Performed by: INTERNAL MEDICINE

## 2023-10-14 PROCEDURE — 63710000001 NYSTATIN 100,000 UNIT/ML SUSPENSION: Performed by: INTERNAL MEDICINE

## 2023-10-14 PROCEDURE — 99204 OFFICE O/P NEW MOD 45 MIN: CPT | Performed by: NURSE PRACTITIONER

## 2023-10-14 PROCEDURE — 25010000002 THIAMINE PER 100 MG: Performed by: INTERNAL MEDICINE

## 2023-10-14 PROCEDURE — 80048 BASIC METABOLIC PNL TOTAL CA: CPT | Performed by: INTERNAL MEDICINE

## 2023-10-14 PROCEDURE — 84100 ASSAY OF PHOSPHORUS: CPT | Performed by: INTERNAL MEDICINE

## 2023-10-14 PROCEDURE — 83735 ASSAY OF MAGNESIUM: CPT | Performed by: INTERNAL MEDICINE

## 2023-10-14 PROCEDURE — 88342 IMHCHEM/IMCYTCHM 1ST ANTB: CPT | Performed by: INTERNAL MEDICINE

## 2023-10-14 RX ORDER — LORAZEPAM 2 MG/ML
2 INJECTION INTRAMUSCULAR
Status: DISCONTINUED | OUTPATIENT
Start: 2023-10-14 | End: 2023-10-15 | Stop reason: HOSPADM

## 2023-10-14 RX ORDER — IBUPROFEN 600 MG/1
1 TABLET ORAL
Status: DISCONTINUED | OUTPATIENT
Start: 2023-10-14 | End: 2023-10-15 | Stop reason: HOSPADM

## 2023-10-14 RX ORDER — LIDOCAINE HYDROCHLORIDE 10 MG/ML
INJECTION, SOLUTION EPIDURAL; INFILTRATION; INTRACAUDAL; PERINEURAL AS NEEDED
Status: DISCONTINUED | OUTPATIENT
Start: 2023-10-14 | End: 2023-10-14 | Stop reason: SURG

## 2023-10-14 RX ORDER — SODIUM CHLORIDE 9 MG/ML
75 INJECTION, SOLUTION INTRAVENOUS CONTINUOUS
Status: DISCONTINUED | OUTPATIENT
Start: 2023-10-14 | End: 2023-10-14

## 2023-10-14 RX ORDER — LORAZEPAM 1 MG/1
1 TABLET ORAL
Status: DISCONTINUED | OUTPATIENT
Start: 2023-10-14 | End: 2023-10-15 | Stop reason: HOSPADM

## 2023-10-14 RX ORDER — LORAZEPAM 1 MG/1
2 TABLET ORAL
Status: DISCONTINUED | OUTPATIENT
Start: 2023-10-14 | End: 2023-10-15 | Stop reason: HOSPADM

## 2023-10-14 RX ORDER — THIAMINE HYDROCHLORIDE 100 MG/ML
200 INJECTION, SOLUTION INTRAMUSCULAR; INTRAVENOUS EVERY 8 HOURS SCHEDULED
Status: DISCONTINUED | OUTPATIENT
Start: 2023-10-14 | End: 2023-10-15 | Stop reason: HOSPADM

## 2023-10-14 RX ORDER — PANTOPRAZOLE SODIUM 40 MG/1
40 TABLET, DELAYED RELEASE ORAL
Status: DISCONTINUED | OUTPATIENT
Start: 2023-10-14 | End: 2023-10-15 | Stop reason: HOSPADM

## 2023-10-14 RX ORDER — DEXTROSE MONOHYDRATE 25 G/50ML
25 INJECTION, SOLUTION INTRAVENOUS
Status: DISCONTINUED | OUTPATIENT
Start: 2023-10-14 | End: 2023-10-15 | Stop reason: HOSPADM

## 2023-10-14 RX ORDER — FLUORIDE TOOTHPASTE
15 TOOTHPASTE DENTAL
Status: DISCONTINUED | OUTPATIENT
Start: 2023-10-14 | End: 2023-10-15 | Stop reason: HOSPADM

## 2023-10-14 RX ORDER — SODIUM CHLORIDE 9 MG/ML
100 INJECTION, SOLUTION INTRAVENOUS CONTINUOUS
Status: DISCONTINUED | OUTPATIENT
Start: 2023-10-14 | End: 2023-10-15 | Stop reason: HOSPADM

## 2023-10-14 RX ORDER — NICOTINE POLACRILEX 4 MG
15 LOZENGE BUCCAL
Status: DISCONTINUED | OUTPATIENT
Start: 2023-10-14 | End: 2023-10-15 | Stop reason: HOSPADM

## 2023-10-14 RX ORDER — SODIUM CHLORIDE, SODIUM LACTATE, POTASSIUM CHLORIDE, CALCIUM CHLORIDE 600; 310; 30; 20 MG/100ML; MG/100ML; MG/100ML; MG/100ML
INJECTION, SOLUTION INTRAVENOUS CONTINUOUS PRN
Status: DISCONTINUED | OUTPATIENT
Start: 2023-10-14 | End: 2023-10-14 | Stop reason: SURG

## 2023-10-14 RX ORDER — SODIUM CHLORIDE, SODIUM LACTATE, POTASSIUM CHLORIDE, CALCIUM CHLORIDE 600; 310; 30; 20 MG/100ML; MG/100ML; MG/100ML; MG/100ML
30 INJECTION, SOLUTION INTRAVENOUS CONTINUOUS PRN
Status: CANCELLED | OUTPATIENT
Start: 2023-10-14

## 2023-10-14 RX ORDER — INSULIN LISPRO 100 [IU]/ML
2-7 INJECTION, SOLUTION INTRAVENOUS; SUBCUTANEOUS
Status: DISCONTINUED | OUTPATIENT
Start: 2023-10-14 | End: 2023-10-15 | Stop reason: HOSPADM

## 2023-10-14 RX ORDER — POLYETHYLENE GLYCOL 3350 17 G/17G
17 POWDER, FOR SOLUTION ORAL DAILY
Status: DISCONTINUED | OUTPATIENT
Start: 2023-10-14 | End: 2023-10-15 | Stop reason: HOSPADM

## 2023-10-14 RX ORDER — FOLIC ACID 1 MG/1
1 TABLET ORAL DAILY
Status: DISCONTINUED | OUTPATIENT
Start: 2023-10-14 | End: 2023-10-15 | Stop reason: HOSPADM

## 2023-10-14 RX ORDER — PROPOFOL 10 MG/ML
VIAL (ML) INTRAVENOUS AS NEEDED
Status: DISCONTINUED | OUTPATIENT
Start: 2023-10-14 | End: 2023-10-14 | Stop reason: SURG

## 2023-10-14 RX ORDER — LORAZEPAM 2 MG/ML
1 INJECTION INTRAMUSCULAR
Status: DISCONTINUED | OUTPATIENT
Start: 2023-10-14 | End: 2023-10-15 | Stop reason: HOSPADM

## 2023-10-14 RX ORDER — PANTOPRAZOLE SODIUM 40 MG/10ML
40 INJECTION, POWDER, LYOPHILIZED, FOR SOLUTION INTRAVENOUS EVERY 12 HOURS SCHEDULED
Status: DISCONTINUED | OUTPATIENT
Start: 2023-10-14 | End: 2023-10-14

## 2023-10-14 RX ADMIN — ASPIRIN 81 MG: 81 TABLET, COATED ORAL at 08:21

## 2023-10-14 RX ADMIN — THIAMINE HYDROCHLORIDE 200 MG: 100 INJECTION, SOLUTION INTRAMUSCULAR; INTRAVENOUS at 21:20

## 2023-10-14 RX ADMIN — FOLIC ACID 1 MG: 1 TABLET ORAL at 08:22

## 2023-10-14 RX ADMIN — POLYETHYLENE GLYCOL 3350 17 G: 17 POWDER, FOR SOLUTION ORAL at 22:16

## 2023-10-14 RX ADMIN — INSULIN LISPRO 5 UNITS: 100 INJECTION, SOLUTION INTRAVENOUS; SUBCUTANEOUS at 08:31

## 2023-10-14 RX ADMIN — NYSTATIN 500000 UNITS: 100000 SUSPENSION ORAL at 21:54

## 2023-10-14 RX ADMIN — Medication 10 ML: at 08:22

## 2023-10-14 RX ADMIN — PANTOPRAZOLE SODIUM 40 MG: 40 TABLET, DELAYED RELEASE ORAL at 18:18

## 2023-10-14 RX ADMIN — Medication 10 ML: at 21:55

## 2023-10-14 RX ADMIN — ALLOPURINOL 100 MG: 100 TABLET ORAL at 08:22

## 2023-10-14 RX ADMIN — AMLODIPINE BESYLATE 2.5 MG: 2.5 TABLET ORAL at 08:21

## 2023-10-14 RX ADMIN — LIDOCAINE HYDROCHLORIDE 100 MG: 10 INJECTION, SOLUTION EPIDURAL; INFILTRATION; INTRACAUDAL; PERINEURAL at 17:07

## 2023-10-14 RX ADMIN — LORAZEPAM 2 MG: 1 TABLET ORAL at 08:21

## 2023-10-14 RX ADMIN — NYSTATIN 500000 UNITS: 100000 SUSPENSION ORAL at 08:22

## 2023-10-14 RX ADMIN — NYSTATIN 500000 UNITS: 100000 SUSPENSION ORAL at 18:18

## 2023-10-14 RX ADMIN — PANTOPRAZOLE SODIUM 40 MG: 40 INJECTION, POWDER, FOR SOLUTION INTRAVENOUS at 05:33

## 2023-10-14 RX ADMIN — SODIUM CHLORIDE, POTASSIUM CHLORIDE, SODIUM LACTATE AND CALCIUM CHLORIDE: 600; 310; 30; 20 INJECTION, SOLUTION INTRAVENOUS at 17:07

## 2023-10-14 RX ADMIN — PROPOFOL 100 MG: 10 INJECTION, EMULSION INTRAVENOUS at 17:07

## 2023-10-14 RX ADMIN — SODIUM CHLORIDE 100 ML/HR: 9 INJECTION, SOLUTION INTRAVENOUS at 11:23

## 2023-10-14 RX ADMIN — THIAMINE HYDROCHLORIDE 200 MG: 100 INJECTION, SOLUTION INTRAMUSCULAR; INTRAVENOUS at 18:18

## 2023-10-14 RX ADMIN — SENNOSIDES AND DOCUSATE SODIUM 2 TABLET: 8.6; 5 TABLET ORAL at 08:20

## 2023-10-14 RX ADMIN — SENNOSIDES AND DOCUSATE SODIUM 2 TABLET: 8.6; 5 TABLET ORAL at 21:54

## 2023-10-14 RX ADMIN — INSULIN DETEMIR 15 UNITS: 100 INJECTION, SOLUTION SUBCUTANEOUS at 21:54

## 2023-10-14 RX ADMIN — THIAMINE HYDROCHLORIDE 200 MG: 100 INJECTION, SOLUTION INTRAMUSCULAR; INTRAVENOUS at 05:33

## 2023-10-14 RX ADMIN — ATORVASTATIN CALCIUM 80 MG: 40 TABLET, FILM COATED ORAL at 21:54

## 2023-10-14 RX ADMIN — PANTOPRAZOLE SODIUM 40 MG: 40 INJECTION, POWDER, FOR SOLUTION INTRAVENOUS at 11:50

## 2023-10-14 NOTE — ANESTHESIA PREPROCEDURE EVALUATION
Anesthesia Evaluation     Patient summary reviewed and Nursing notes reviewed                Airway   Mallampati: II  TM distance: >3 FB  Neck ROM: full  No difficulty expected  Dental - normal exam     Pulmonary - normal exam   (+) asthma,  Cardiovascular - normal exam    (+) hypertension      Neuro/Psych- negative ROS  GI/Hepatic/Renal/Endo    (+) liver disease, renal disease stones, diabetes mellitus    Musculoskeletal     Abdominal  - normal exam    Bowel sounds: normal.   Substance History - negative use     OB/GYN negative ob/gyn ROS         Other   arthritis,   history of cancer (PROSTATE)                Anesthesia Plan    ASA 3     general     (PROPOFOL)  intravenous induction     Anesthetic plan, risks, benefits, and alternatives have been provided, discussed and informed consent has been obtained with: patient.    Plan discussed with CRNA.    CODE STATUS:    Code Status (Patient has no pulse and is not breathing): CPR (Attempt to Resuscitate)  Medical Interventions (Patient has pulse or is breathing): Full

## 2023-10-14 NOTE — SIGNIFICANT NOTE
10/14/23 1341   SLP Deferred Reason   SLP Deferred Reason   (SLP consult received for dysphagia. Pt currently NPO for scheduled EGD. Will defer and f/u when appropriate.)

## 2023-10-14 NOTE — PLAN OF CARE
Goal Outcome Evaluation: Aox4 room air pt is resting in bed call light within reach.

## 2023-10-14 NOTE — CONSULTS
[facility] [place] Gastroenterology Consult    Referring Provider: No ref. provider found    PCP: Ramesh Trivedi MD    Reason for Consultation: Dysphagia, candidiasis    Chief complaint: Abdominal pain    History of present illness:    Bhaskar Torres is a 68 y.o. male who is admitted with a month-long onset of worsening epigastric abdominal pain.  Patient notes he has epigastric abdominal pain that is worse following meals; pain is nonradiating and follows both solid and liquid intake.  No prior episode of pain there of.  Does have nausea without vomiting associated with onset.  Also reports chronic constipation for which he uses Dulcolax with minimal response.  Denies any shortness of breath, chest pain, or fever/chills.  No use of NSAIDs and is not anticoagulated.  Reports he had an EGD approximately 20 years ago but unsure on the results thereof.  Reports 1 bourbon per day and not to excess. Past medical, surgical, social, and family histories are reviewed for accuracy.  No documented alleviating or exacerbating factors.  Does not endorse pain at time of exam.      Allergies:  Amoxicillin    Scheduled Meds:  allopurinol, 100 mg, Oral, Daily  amLODIPine, 2.5 mg, Oral, Daily  aspirin, 81 mg, Oral, Daily  atorvastatin, 80 mg, Oral, Nightly  fluconazole, 200 mg, Intravenous, Q24H  folic acid, 1 mg, Oral, Daily  insulin detemir, 15 Units, Subcutaneous, Nightly  insulin lispro, 2-7 Units, Subcutaneous, 4x Daily AC & at Bedtime  nystatin, 5 mL, Swish & Swallow, 4x Daily  pantoprazole, 40 mg, Intravenous, Q12H  senna-docusate sodium, 2 tablet, Oral, BID  sodium chloride, 10 mL, Intravenous, Q12H  thiamine (B-1) IV, 200 mg, Intravenous, Q8H   Followed by  [START ON 10/19/2023] thiamine, 100 mg, Oral, Daily         Infusions:  sodium chloride, 100 mL/hr, Last Rate: 100 mL/hr (10/14/23 1123)        PRN Meds:    senna-docusate sodium **AND** polyethylene glycol **AND** bisacodyl **AND** bisacodyl    Calcium Replacement - Follow Nurse  / BPA Driven Protocol    dextrose    dextrose    dextrose    glucagon (human recombinant)    insulin lispro    LORazepam **OR** LORazepam **OR** LORazepam **OR** LORazepam **OR** LORazepam **OR** LORazepam    Magnesium Low Dose Replacement - Follow Nurse / BPA Driven Protocol    Morphine    ondansetron    Phosphorus Replacement - Follow Nurse / BPA Driven Protocol    Potassium Replacement - Follow Nurse / BPA Driven Protocol    Sodium Chloride (PF)    Insert Peripheral IV **AND** sodium chloride    sodium chloride    sodium chloride    Home Meds:  Medications Prior to Admission   Medication Sig Dispense Refill Last Dose    allopurinol (ZYLOPRIM) 100 MG tablet Take 1 tablet by mouth Daily.   10/12/2023    amLODIPine (NORVASC) 5 MG tablet Take 1 tablet by mouth Every Night.   10/12/2023    aspirin 81 MG EC tablet Take 1 tablet by mouth Daily.   10/12/2023    atorvastatin (LIPITOR) 80 MG tablet Take 1 tablet by mouth Daily.   10/12/2023    glipizide (GLUCOTROL XL) 2.5 MG 24 hr tablet Take 1 tablet by mouth Daily.   10/12/2023    metFORMIN (GLUCOPHAGE) 500 MG tablet Take 1 tablet by mouth Daily With Breakfast.   10/12/2023    metoprolol succinate XL (TOPROL-XL) 100 MG 24 hr tablet Take 1 tablet by mouth Daily.   10/12/2023    nystatin (MYCOSTATIN) 100,000 unit/mL suspension Swish and swallow 10 mL 4 (Four) Times a Day.   10/12/2023    ondansetron (ZOFRAN) 4 MG tablet Take 1 tablet by mouth Every 8 (Eight) Hours As Needed for Nausea or Vomiting.   Past Week    loratadine (CLARITIN) 10 MG tablet Take 1 tablet by mouth Daily As Needed.   More than a month       ROS: Review of Systems   Constitutional:  Positive for activity change and appetite change. Negative for chills, diaphoresis, fatigue, fever and unexpected weight change.   HENT:  Positive for sore throat and trouble swallowing. Negative for voice change.    Eyes: Negative.    Respiratory:  Negative for apnea, cough, choking, chest tightness, shortness of breath,  "wheezing and stridor.    Cardiovascular:  Negative for chest pain, palpitations and leg swelling.   Gastrointestinal:  Positive for abdominal pain, constipation and nausea. Negative for abdominal distention, anal bleeding, blood in stool, diarrhea, rectal pain and vomiting.   Endocrine: Negative.    Genitourinary: Negative.    Musculoskeletal: Negative.    Skin: Negative.    Allergic/Immunologic: Negative.    Neurological: Negative.    Hematological: Negative.    Psychiatric/Behavioral: Negative.     All other systems reviewed and are negative.      PAST MED HX:  Past Medical History:   Diagnosis Date    Arthritis     Asthma     Bronchitis     Diabetes mellitus     Gout     Hypertension     Kidney stones     Prostate cancer        PAST SURG HX:  Past Surgical History:   Procedure Laterality Date    ADENOIDECTOMY      HERNIA REPAIR      PROSTATECTOMY      THYROID SURGERY Left     TONSILLECTOMY         FAM HX:  Family History   Problem Relation Age of Onset    Heart disease Father     Pancreatic cancer Maternal Grandfather 72       SOC HX:  Social History     Socioeconomic History    Marital status: Unknown   Tobacco Use    Smoking status: Never     Passive exposure: Never    Smokeless tobacco: Never   Vaping Use    Vaping Use: Never used   Substance and Sexual Activity    Alcohol use: Not Currently     Comment: 35oz per week    Drug use: Never       PHYSICAL EXAM  /97   Pulse 57   Temp 98.2 °F (36.8 °C) (Oral)   Resp 18   Ht 182.9 cm (72\")   Wt 97 kg (213 lb 12.8 oz)   SpO2 94%   BMI 29.00 kg/m²   Wt Readings from Last 3 Encounters:   10/13/23 97 kg (213 lb 12.8 oz)   11/12/21 110 kg (242 lb 9.6 oz)   10/12/21 112 kg (246 lb 8 oz)   ,body mass index is 29 kg/m².  Physical Exam  Vitals and nursing note reviewed.   Constitutional:       General: He is not in acute distress.     Appearance: Normal appearance. He is normal weight. He is not ill-appearing or toxic-appearing.   HENT:      Head: Normocephalic " "and atraumatic.   Eyes:      General: No scleral icterus.     Extraocular Movements: Extraocular movements intact.      Conjunctiva/sclera: Conjunctivae normal.      Pupils: Pupils are equal, round, and reactive to light.   Cardiovascular:      Rate and Rhythm: Normal rate and regular rhythm.      Pulses: Normal pulses.      Heart sounds: Normal heart sounds.   Pulmonary:      Effort: Pulmonary effort is normal. No respiratory distress.      Breath sounds: Normal breath sounds.   Abdominal:      General: Abdomen is flat. Bowel sounds are normal. There is no distension.      Palpations: Abdomen is soft. There is no mass.      Tenderness: There is abdominal tenderness. There is no guarding or rebound.      Hernia: No hernia is present.   Genitourinary:     Comments: defer  Musculoskeletal:         General: Normal range of motion.      Right lower leg: No edema.      Left lower leg: No edema.   Skin:     General: Skin is warm and dry.      Capillary Refill: Capillary refill takes less than 2 seconds.      Coloration: Skin is pale. Skin is not jaundiced.   Neurological:      General: No focal deficit present.      Mental Status: He is alert and oriented to person, place, and time.   Psychiatric:         Mood and Affect: Mood normal.         Behavior: Behavior normal.         Thought Content: Thought content normal.         Judgment: Judgment normal.         Results Review:   I reviewed the patient's new clinical results.  I reviewed the patient's new imaging results and agree with the interpretation.  I reviewed the patient's other test results and agree with the interpretation  I personally viewed and interpreted the patient's EKG/Telemetry data    Lab Results   Component Value Date    WBC 8.35 10/13/2023    HGB 15.4 10/13/2023    HGB 14.9 10/12/2021    HCT 43.6 10/13/2023    MCV 92.4 10/13/2023     10/13/2023       No results found for: \"INR\"    Lab Results   Component Value Date    GLUCOSE 140 (H) 10/14/2023 "    BUN 23 10/14/2023    CREATININE 0.90 10/14/2023    EGFRIFNONA 68 10/12/2021    EGFRIFAFRI 83 10/12/2021    BCR 25.6 (H) 10/14/2023     (H) 10/14/2023    K 4.2 10/14/2023    CO2 30.0 (H) 10/14/2023    CALCIUM 8.8 10/14/2023    PROTENTOTREF 6.6 10/12/2021    ALBUMIN 4.3 10/13/2023    ALKPHOS 105 10/13/2023    BILITOT 1.3 (H) 10/13/2023    ALT 44 (H) 10/13/2023    AST 19 10/13/2023       CT Abdomen Pelvis With Contrast    Result Date: 10/13/2023  CT ABDOMEN PELVIS W CONTRAST Date of Exam: 10/13/2023 9:49 AM CDT Indication: abd pain, decrease intake. constipation, r/o obstruction.. Comparison: None available. Technique: Axial CT images were obtained of the abdomen and pelvis following the uneventful intravenous administration of Isovue. Reconstructed coronal and sagittal images were also obtained. Automated exposure control and iterative construction methods were used. Exam is limited by patient motion artifact. Findings: LUNG BASES: There is an 8 mm nodule at the right lung base (image 11 of series 5). LIVER: There are scattered low attenuating lesions throughout the liver are subcentimeter in size too small to characterize. BILIARY/GALLBLADDER:  Unremarkable SPLEEN:  Unremarkable PANCREAS:  Unremarkable ADRENAL:  Unremarkable KIDNEYS:  Unremarkable parenchyma with no solid mass identified. No obstruction.  The right kidney contains an exophytic 7.7 cm cyst. No follow-up imaging necessary. A cyst is present in the lower pole of the left kidney measuring 1.2 cm. No follow-up necessary. GASTROINTESTINAL/MESENTERY: Evaluation of the gastrointestinal tract demonstrates diverticulosis without CT evidence for acute diverticulitis. The appendix is not definitively identified. MESENTERIC VESSELS:  Patent. AORTA/IVC:  Normal caliber. RETROPERITONEUM/LYMPH NODES:  Unremarkable REPRODUCTIVE:  Unremarkable BLADDER:  Unremarkable OSSEUS STRUCTURES: There are scattered vertebral body hemangiomata. Sclerotic densities are  "present in the posterior left iliac bone.     Impression: 1. Diverticulosis. 2. 8 mm nodule at the right lung base. Recommend a follow-up noncontrast CT thorax to serve as a baseline exam. Electronically Signed: Sandra Joy MD  10/13/2023 10:20 AM CDT  Workstation ID: [id number]      No results found for: \"COVID19\"    ASSESSMENTS/PLANS  1.  Acute epigastric abdominal pain, postprandial abdominal pain  2.  Non-intractable nausea without vomiting  3.  Acute on chronic constipation  4.  Oropharyngeal candidiasis, concern for esophageal candidiasis  5.  Type 2 diabetes mellitus.  6.  Incidental 8 mm right lower lung nodule  7.  Essential hypertension  8.  History of prostate CA  9.  Family history of pancreatic cancer    Bhaskar Torres is a 68 y.o. male who presents to hospital with a month-long onset of worsening epigastric abdominal pain and nausea.  Symptoms are worse postprandially and patient complains of significant epigastric pain.  Recommend EGD for further evaluation.  We will additionally address patient's chronic constipation issues this admission.    >>> Maintain n.p.o.  >>> Obtain informed consent for esophagogastroduodenoscopy  >>> IV PPI twice daily  >>> Continue Diflucan for concerns of candidiasis  >>> Begin daily MiraLAX; if no BM every 3 days, use Dulcolax suppository  If no response to above, will need to follow-up with gastroenterology for further refinement of bowel regimen.  >>> Noted mildly elevated CA 19-9; can follow-up on this in outpatient clinic for outpatient MRI in future-noted CT abdomen pelvis with contrast was unremarkable in terms of hepatobiliary system.  >>> Await work-up for Sjogren's; will add Biotene mouthwash for comfort    I discussed the patient's findings and my recommendations with patient, family, primary care team, and consulting provider    TOMASZ Villa  10/14/23  12:46 EDT    "

## 2023-10-14 NOTE — ANESTHESIA POSTPROCEDURE EVALUATION
Patient: Bhaskar Torres    Procedure Summary       Date: 10/14/23 Room / Location: [facility] ENDOSCOPY 3 /  [facility]    Anesthesia Start: 1658 Anesthesia Stop: 1718    Procedure: ESOPHAGOGASTRODUODENOSCOPY Diagnosis:     Surgeons: Samy Real MD Provider: Todd Rosa MD    Anesthesia Type: general ASA Status: 3            Anesthesia Type: general    Vitals  No vitals data found for the desired time range.          Post Anesthesia Care and Evaluation    Patient location during evaluation: PACU  Patient participation: complete - patient participated  Level of consciousness: awake and alert  Pain management: adequate    Airway patency: patent  Anesthetic complications: No anesthetic complications  PONV Status: none  Cardiovascular status: hemodynamically stable and acceptable  Respiratory status: nonlabored ventilation, acceptable and nasal cannula  Hydration status: acceptable

## 2023-10-14 NOTE — PROGRESS NOTES
[facility] Medicine Services  PROGRESS NOTE    Patient Name: Bhaskar Torres  : 1955  MRN: [id number]    Date of Admission: 10/13/2023  Primary Care Physician: Ramesh Trivedi MD    Subjective   Subjective     CC:  Abd pain, n/v, uncontrolled dm,    HPI:  A bit drowsy today w/ ativan. No current pain. No current nausea. Wasn't hungry this morning so ate nothing. No current dyspnea or chest pain      Objective   Objective     Vital Signs:   Temp:  [97.6 °F (36.4 °C)-98.2 °F (36.8 °C)] 98.2 °F (36.8 °C)  Heart Rate:  [56-63] 57  Resp:  [18] 18  BP: (135-157)/(80-97) 152/97  Flow (L/min):  [2] 2     Physical Exam:  Constitutional:Alert, oriented x 3, nontoxic appearing  Psych:Normal/appropriate affect  HEENT:NCAT, oropharynx clear  Neck: neck supple, full range of motion  Neuro: Face symmetric, speech clear, equal , moves all extremities  Cardiac: RRR; No pretibial pitting edema  Resp: CTAB, normal effort  GI: abd soft, nontender  Skin: No extremity rash  Musculoskeletal/extremities: no cyanosis of extremities; no significant ankle edema      Results Reviewed:  LAB RESULTS:      Lab 10/13/23  1035   WBC 8.35   HEMOGLOBIN 15.4   HEMATOCRIT 43.6   PLATELETS 157   NEUTROS ABS 6.97   IMMATURE GRANS (ABS) 0.11*   LYMPHS ABS 0.54*   MONOS ABS 0.66   EOS ABS 0.05   MCV 92.4   PROCALCITONIN 0.10   LACTATE 1.7         Lab 10/14/23  0602 10/13/23  2047 10/13/23  1043 10/13/23  1041 10/13/23  1035   SODIUM 147* 140  --   --  140   POTASSIUM 4.2 4.0  --   --  4.3   CHLORIDE 111* 102  --   --  100   CO2 30.0* 30.0*  --   --  30.0*   ANION GAP 6.0 8.0  --   --  10.0   BUN 23 26*  --   --  34*   CREATININE 0.90 1.01 1.30 1.30 1.29*   EGFR 93.0 81.0  --   --  60.4   GLUCOSE 140* 277*  --   --  447*   CALCIUM 8.8 8.8  --   --  9.7   MAGNESIUM 1.9 1.9  --   --   --    PHOSPHORUS 2.4*  --   --   --   --    HEMOGLOBIN A1C  --   --   --   --  11.30*   TSH  --   --   --   --  0.998         Lab  10/13/23  2047 10/13/23  1035   TOTAL PROTEIN  --  6.4   ALBUMIN  --  4.3   GLOBULIN  --  2.1   ALT (SGPT)  --  44*   AST (SGOT)  --  19   BILIRUBIN  --  1.3*   ALK PHOS  --  105   LIPASE 29 36             Lab 10/14/23  0602   CHOLESTEROL 147   LDL CHOL 82   HDL CHOL 36*   TRIGLYCERIDES 167*             Brief Urine Lab Results  (Last result in the past 365 days)        Color   Clarity   Blood   Leuk Est   Nitrite   Protein   CREAT   Urine HCG        10/13/23 1046 Yellow   Cloudy   Small (1+)   Negative   Negative   Negative                   Microbiology Results Abnormal       None            CT Abdomen Pelvis With Contrast    Result Date: 10/13/2023  CT ABDOMEN PELVIS W CONTRAST Date of Exam: 10/13/2023 9:49 AM CDT Indication: abd pain, decrease intake. constipation, r/o obstruction.. Comparison: None available. Technique: Axial CT images were obtained of the abdomen and pelvis following the uneventful intravenous administration of Isovue. Reconstructed coronal and sagittal images were also obtained. Automated exposure control and iterative construction methods were used. Exam is limited by patient motion artifact. Findings: LUNG BASES: There is an 8 mm nodule at the right lung base (image 11 of series 5). LIVER: There are scattered low attenuating lesions throughout the liver are subcentimeter in size too small to characterize. BILIARY/GALLBLADDER:  Unremarkable SPLEEN:  Unremarkable PANCREAS:  Unremarkable ADRENAL:  Unremarkable KIDNEYS:  Unremarkable parenchyma with no solid mass identified. No obstruction.  The right kidney contains an exophytic 7.7 cm cyst. No follow-up imaging necessary. A cyst is present in the lower pole of the left kidney measuring 1.2 cm. No follow-up necessary. GASTROINTESTINAL/MESENTERY: Evaluation of the gastrointestinal tract demonstrates diverticulosis without CT evidence for acute diverticulitis. The appendix is not definitively identified. MESENTERIC VESSELS:  Patent. AORTA/IVC:   Normal caliber. RETROPERITONEUM/LYMPH NODES:  Unremarkable REPRODUCTIVE:  Unremarkable BLADDER:  Unremarkable OSSEUS STRUCTURES: There are scattered vertebral body hemangiomata. Sclerotic densities are present in the posterior left iliac bone.     Impression: Impression: 1. Diverticulosis. 2. 8 mm nodule at the right lung base. Recommend a follow-up noncontrast CT thorax to serve as a baseline exam. Electronically Signed: Sandra Joy MD  10/13/2023 10:20 AM CDT  Workstation ID: [id number]         Current medications:  Scheduled Meds:allopurinol, 100 mg, Oral, Daily  amLODIPine, 2.5 mg, Oral, Daily  aspirin, 81 mg, Oral, Daily  atorvastatin, 80 mg, Oral, Nightly  fluconazole, 200 mg, Intravenous, Q24H  folic acid, 1 mg, Oral, Daily  insulin detemir, 15 Units, Subcutaneous, Nightly  insulin lispro, 2-7 Units, Subcutaneous, 4x Daily AC & at Bedtime  nystatin, 5 mL, Swish & Swallow, 4x Daily  pantoprazole, 40 mg, Intravenous, Q12H  senna-docusate sodium, 2 tablet, Oral, BID  sodium chloride, 10 mL, Intravenous, Q12H  thiamine (B-1) IV, 200 mg, Intravenous, Q8H   Followed by  [START ON 10/19/2023] thiamine, 100 mg, Oral, Daily  thiamine (B-1) IV, 200 mg, Intravenous, Q8H   Followed by  [START ON 10/19/2023] thiamine, 100 mg, Oral, Daily      Continuous Infusions:sodium chloride, 75 mL/hr      PRN Meds:.  senna-docusate sodium **AND** polyethylene glycol **AND** bisacodyl **AND** bisacodyl    Calcium Replacement - Follow Nurse / BPA Driven Protocol    dextrose    dextrose    dextrose    dextrose    glucagon (human recombinant)    glucagon (human recombinant)    insulin lispro    LORazepam **OR** LORazepam **OR** LORazepam **OR** LORazepam **OR** LORazepam **OR** LORazepam    Magnesium Low Dose Replacement - Follow Nurse / BPA Driven Protocol    Morphine    ondansetron    Phosphorus Replacement - Follow Nurse / BPA Driven Protocol    Potassium Replacement - Follow Nurse / BPA Driven Protocol    Sodium Chloride (PF)     Insert Peripheral IV **AND** sodium chloride    sodium chloride    sodium chloride    Assessment & Plan   Assessment & Plan     Active Hospital Problems    Diagnosis  POA    **Type 2 diabetes mellitus with hyperglycemia [E11.65]  Yes    Oral candidiasis [B37.0]  Yes    Weight loss [R63.4]  Yes      Resolved Hospital Problems   No resolved problems to display.        Brief Hospital Course to date:  Bhaskar Torres is a 68 y.o. male w/ dm2, htn, hl, prostate cancer, daily eoth use (admits to only 1-2 2oz bourbon drinks daily) who presents w/ progressive post-prandial abd pain, nausea and weight loss, polyuria over the past coupe of weeks, more notably over 3-4 days prior to this admission. Doesn't check his sugars. No fever. Fsbs >400. A1c >11. Ct a/p without acute intraabdominal process, u/a benign, lipsae ok. Admitted for iv fluids and gi consultation, glucose control.      Post-prandial N/V & abd pain w/ weight loss  Mildly abnormal ca 19-9  -ct a/p negative intraadominal process  -lipase ok  -u/a benign  -admitting provider ordered ca 19-9, mildly elevated 86  -continue bid ppi  -continue iv fluids  -GI consult pending: npo til seen by GI, ? egd    Oral candiaiss on exam  -day #2 diflucan  -if gets egd will clarify whether esophagitis as well    ETOH use/abuse  -states only drinks one whiskey 2oz nightly; denies tremors or previous withdrawal  -currently sedated on scheduled ativan; will stop ativan, change to only prn ativan/ciwa  -thiamine and folate replacement    Uncontrolled DM (A1c >11) w/ marked hyperglycemia  -on only metformin at home  -uncontrolled dm likely playing a large role in symptoms  -levemir 15 units sq nightly, sliding scale, titrate prn  -diabetes educator pending  -likely needs insulin at d/c (at least short term)    Incidental 8mm right lower lung nodule (seen incidentally on ct a/p)  -follow imaging as outptient w/ dedicated ct scan    Htn  -continue amlodipine    HL  -statin    Hx prostate  ca      Am labs: cbc,bmp,mag    Expected Discharge Location and Transportation: home  Expected Discharge 10/15/23 depending on clinical course    DVT prophylaxis:  Mechanical DVT prophylaxis orders are present.     AM-PAC 6 Clicks Score (PT): 24 (10/13/23 2000)    CODE STATUS:   Code Status and Medical Interventions:   Ordered at: 10/13/23 1440     Code Status (Patient has no pulse and is not breathing):    CPR (Attempt to Resuscitate)     Medical Interventions (Patient has pulse or is breathing):    Full       Aramis Burroughs MD  10/14/23

## 2023-10-15 ENCOUNTER — READMISSION MANAGEMENT (OUTPATIENT)
Dept: CALL CENTER | Facility: HOSPITAL | Age: 68
End: 2023-10-15
Payer: MEDICARE

## 2023-10-15 VITALS
WEIGHT: 213.8 LBS | SYSTOLIC BLOOD PRESSURE: 147 MMHG | TEMPERATURE: 98 F | RESPIRATION RATE: 18 BRPM | OXYGEN SATURATION: 94 % | HEIGHT: 72 IN | BODY MASS INDEX: 28.96 KG/M2 | HEART RATE: 70 BPM | DIASTOLIC BLOOD PRESSURE: 88 MMHG

## 2023-10-15 LAB
ANION GAP SERPL CALCULATED.3IONS-SCNC: 11 MMOL/L (ref 5–15)
BUN SERPL-MCNC: 16 MG/DL (ref 8–23)
BUN/CREAT SERPL: 16 (ref 7–25)
CALCIUM SPEC-SCNC: 8.8 MG/DL (ref 8.6–10.5)
CHLORIDE SERPL-SCNC: 109 MMOL/L (ref 98–107)
CO2 SERPL-SCNC: 24 MMOL/L (ref 22–29)
CREAT SERPL-MCNC: 1 MG/DL (ref 0.76–1.27)
DEPRECATED RDW RBC AUTO: 45.2 FL (ref 37–54)
EGFRCR SERPLBLD CKD-EPI 2021: 82 ML/MIN/1.73
ERYTHROCYTE [DISTWIDTH] IN BLOOD BY AUTOMATED COUNT: 13.4 % (ref 12.3–15.4)
GLUCOSE BLDC GLUCOMTR-MCNC: 131 MG/DL (ref 70–130)
GLUCOSE BLDC GLUCOMTR-MCNC: 227 MG/DL (ref 70–130)
GLUCOSE SERPL-MCNC: 116 MG/DL (ref 65–99)
HCT VFR BLD AUTO: 41.9 % (ref 37.5–51)
HGB BLD-MCNC: 14.3 G/DL (ref 13–17.7)
MAGNESIUM SERPL-MCNC: 1.8 MG/DL (ref 1.6–2.4)
MCH RBC QN AUTO: 31.3 PG (ref 26.6–33)
MCHC RBC AUTO-ENTMCNC: 34.1 G/DL (ref 31.5–35.7)
MCV RBC AUTO: 91.7 FL (ref 79–97)
PLATELET # BLD AUTO: 132 10*3/MM3 (ref 140–450)
PMV BLD AUTO: 9.8 FL (ref 6–12)
POTASSIUM SERPL-SCNC: 3.3 MMOL/L (ref 3.5–5.2)
RBC # BLD AUTO: 4.57 10*6/MM3 (ref 4.14–5.8)
SODIUM SERPL-SCNC: 144 MMOL/L (ref 136–145)
WBC NRBC COR # BLD: 5.67 10*3/MM3 (ref 3.4–10.8)

## 2023-10-15 PROCEDURE — 63710000001 FOLIC ACID 1 MG TABLET: Performed by: INTERNAL MEDICINE

## 2023-10-15 PROCEDURE — 83735 ASSAY OF MAGNESIUM: CPT | Performed by: INTERNAL MEDICINE

## 2023-10-15 PROCEDURE — 63710000001 POTASSIUM CHLORIDE 20 MEQ TABLET CONTROLLED-RELEASE: Performed by: INTERNAL MEDICINE

## 2023-10-15 PROCEDURE — 63710000001 POLYETHYLENE GLYCOL 17 G PACK: Performed by: INTERNAL MEDICINE

## 2023-10-15 PROCEDURE — 63710000001 ALLOPURINOL 100 MG TABLET: Performed by: INTERNAL MEDICINE

## 2023-10-15 PROCEDURE — A9270 NON-COVERED ITEM OR SERVICE: HCPCS | Performed by: INTERNAL MEDICINE

## 2023-10-15 PROCEDURE — 25010000002 THIAMINE PER 100 MG: Performed by: INTERNAL MEDICINE

## 2023-10-15 PROCEDURE — 63710000001 AMLODIPINE 2.5 MG TABLET: Performed by: INTERNAL MEDICINE

## 2023-10-15 PROCEDURE — 63710000001 NYSTATIN 100,000 UNIT/ML SUSPENSION: Performed by: INTERNAL MEDICINE

## 2023-10-15 PROCEDURE — 63710000001 SENNOSIDES-DOCUSATE 8.6-50 MG TABLET: Performed by: INTERNAL MEDICINE

## 2023-10-15 PROCEDURE — 63710000001 ASPIRIN 81 MG TABLET DELAYED-RELEASE: Performed by: INTERNAL MEDICINE

## 2023-10-15 PROCEDURE — 80048 BASIC METABOLIC PNL TOTAL CA: CPT | Performed by: INTERNAL MEDICINE

## 2023-10-15 PROCEDURE — 82948 REAGENT STRIP/BLOOD GLUCOSE: CPT

## 2023-10-15 PROCEDURE — 63710000001 INSULIN LISPRO (HUMAN) PER 5 UNITS: Performed by: INTERNAL MEDICINE

## 2023-10-15 PROCEDURE — 85027 COMPLETE CBC AUTOMATED: CPT | Performed by: INTERNAL MEDICINE

## 2023-10-15 PROCEDURE — G0378 HOSPITAL OBSERVATION PER HR: HCPCS

## 2023-10-15 PROCEDURE — 25010000002 FLUCONAZOLE PER 200 MG: Performed by: INTERNAL MEDICINE

## 2023-10-15 PROCEDURE — 92610 EVALUATE SWALLOWING FUNCTION: CPT

## 2023-10-15 PROCEDURE — 63710000001 PANTOPRAZOLE 40 MG TABLET DELAYED-RELEASE: Performed by: INTERNAL MEDICINE

## 2023-10-15 RX ORDER — GLIPIZIDE 2.5 MG/1
5 TABLET, EXTENDED RELEASE ORAL DAILY
Qty: 30 TABLET | Refills: 0 | Status: SHIPPED | OUTPATIENT
Start: 2023-10-15

## 2023-10-15 RX ORDER — POTASSIUM CHLORIDE 20 MEQ/1
40 TABLET, EXTENDED RELEASE ORAL EVERY 4 HOURS
Status: DISCONTINUED | OUTPATIENT
Start: 2023-10-15 | End: 2023-10-15 | Stop reason: HOSPADM

## 2023-10-15 RX ORDER — PANTOPRAZOLE SODIUM 40 MG/1
40 TABLET, DELAYED RELEASE ORAL
Qty: 60 TABLET | Refills: 0 | Status: SHIPPED | OUTPATIENT
Start: 2023-10-15

## 2023-10-15 RX ADMIN — NYSTATIN 500000 UNITS: 100000 SUSPENSION ORAL at 12:31

## 2023-10-15 RX ADMIN — ALLOPURINOL 100 MG: 100 TABLET ORAL at 10:29

## 2023-10-15 RX ADMIN — Medication 10 ML: at 10:30

## 2023-10-15 RX ADMIN — SENNOSIDES AND DOCUSATE SODIUM 2 TABLET: 8.6; 5 TABLET ORAL at 10:28

## 2023-10-15 RX ADMIN — POLYETHYLENE GLYCOL 3350 17 G: 17 POWDER, FOR SOLUTION ORAL at 10:28

## 2023-10-15 RX ADMIN — INSULIN LISPRO 3 UNITS: 100 INJECTION, SOLUTION INTRAVENOUS; SUBCUTANEOUS at 12:30

## 2023-10-15 RX ADMIN — NYSTATIN 500000 UNITS: 100000 SUSPENSION ORAL at 10:28

## 2023-10-15 RX ADMIN — POTASSIUM CHLORIDE 40 MEQ: 1500 TABLET, EXTENDED RELEASE ORAL at 12:05

## 2023-10-15 RX ADMIN — FOLIC ACID 1 MG: 1 TABLET ORAL at 10:28

## 2023-10-15 RX ADMIN — PANTOPRAZOLE SODIUM 40 MG: 40 TABLET, DELAYED RELEASE ORAL at 10:29

## 2023-10-15 RX ADMIN — ASPIRIN 81 MG: 81 TABLET, COATED ORAL at 10:29

## 2023-10-15 RX ADMIN — FLUCONAZOLE 200 MG: 200 INJECTION, SOLUTION INTRAVENOUS at 00:24

## 2023-10-15 RX ADMIN — AMLODIPINE BESYLATE 2.5 MG: 2.5 TABLET ORAL at 10:28

## 2023-10-15 RX ADMIN — THIAMINE HYDROCHLORIDE 200 MG: 100 INJECTION, SOLUTION INTRAMUSCULAR; INTRAVENOUS at 08:09

## 2023-10-15 NOTE — THERAPY EVALUATION
Acute Care - Speech Language Pathology   Swallow Initial Evaluation [facility]  Clinical Swallow Evaluation       Patient Name: Bhaskar Torers  : 1955  MRN: [id number]  Today's Date: 10/15/2023               Admit Date: 10/13/2023    Visit Dx:     ICD-10-CM ICD-9-CM   1. Type 2 diabetes mellitus with hyperglycemia, without long-term current use of insulin  E11.65 250.00     790.29   2. Dehydration  E86.0 276.51   3. Epigastric pain  R10.13 789.06   4. Fatigue, unspecified type  R53.83 780.79   5. Abdominal pain  R10.9 789.00     Patient Active Problem List   Diagnosis    Primary cancer of unknown site    Abnormal bone scan of thoracic spine    Lesion of liver    Type 2 diabetes mellitus with hyperglycemia    Oral candidiasis    Weight loss     Past Medical History:   Diagnosis Date    Arthritis     Asthma     Bronchitis     Diabetes mellitus     Gout     Hypertension     Kidney stones     Prostate cancer      Past Surgical History:   Procedure Laterality Date    ADENOIDECTOMY      HERNIA REPAIR      PROSTATECTOMY      THYROID SURGERY Left     TONSILLECTOMY         SLP Recommendation and Plan  SLP Swallowing Diagnosis: swallow WFL/no suspected pharyngeal impairment (10/15/23 101)  SLP Diet Recommendation: regular textures, thin liquids (10/15/23 101)  Recommended Precautions and Strategies: general aspiration precautions (10/15/23 1015)  SLP Rec. for Method of Medication Administration: meds whole, with thin liquids, as tolerated (10/15/23 1015)     Monitor for Signs of Aspiration: notify SLP if any concerns (10/15/23 101)     Swallow Criteria for Skilled Therapeutic Interventions Met: no problems identified which require skilled intervention (10/15/23 101)  Anticipated Discharge Disposition (SLP): home (10/15/23 101)     Therapy Frequency (Swallow): evaluation only (10/15/23 101)     Oral Care Recommendations: Oral Care BID/PRN, Toothbrush (10/15/23 101)  Demonstrates Need for Referral to Another  Service:  (dietitian) (10/15/23 1015)                                   Oral Care Recommendations: Oral Care BID/PRN, Toothbrush (10/15/23 1015)    Plan of Care Reviewed With: patient, family      SWALLOW EVALUATION (last 72 hours)       SLP Adult Swallow Evaluation       Row Name 10/15/23 1015                   Rehab Evaluation    Document Type evaluation  -VO        Subjective Information no complaints  -VO        Patient Observations alert;cooperative  -VO        Patient/Family/Caregiver Comments/Observations spouse present  -VO        Patient Effort good  -VO           General Information    Patient Profile Reviewed yes  -VO        Pertinent History Of Current Problem adm abdominal pain, DM hypoglycemia. GI w/u w/ EGD revealed oral/esophageal candidiasis and gastritis.  -VO        Current Method of Nutrition full liquids;regular textures  -VO        Precautions/Limitations, Vision WFL;for purposes of eval  -VO        Precautions/Limitations, Hearing WFL  -VO        Prior Level of Function-Communication WFL  -VO        Prior Level of Function-Swallowing concerns regarding malnutrition;concerns regarding dehydration  abdominal pain after drinking H20 for last 10+ years per spouse/pt  -VO        Plans/Goals Discussed with patient;spouse/S.O.;agreed upon  -VO        Barriers to Rehab none identified  -VO        Patient's Goals for Discharge return home  -VO           Pain    Additional Documentation Pain Scale: Numbers Pre/Post-Treatment (Group)  -VO           Pain Scale: Numbers Pre/Post-Treatment    Pretreatment Pain Rating 0/10 - no pain  -VO        Posttreatment Pain Rating 0/10 - no pain  -VO           Oral Motor Structure and Function    Dentition Assessment natural, present and adequate  -VO        Secretion Management WNL/WFL  -VO        Mucosal Quality moist, healthy  -VO        Volitional Swallow WFL  -VO        Volitional Cough WFL  -VO           Oral Musculature and Cranial Nerve Assessment    Oral Motor  General Assessment WFL  -VO           General Eating/Swallowing Observations    Respiratory Support Currently in Use room air  -VO        Eating/Swallowing Skills self-fed;appropriate self-feeding skills observed  -VO        Positioning During Eating upright in bed  -VO        Utensils Used spoon;cup;straw  -VO        Consistencies Trialed regular textures;pureed;thin liquids  -VO           Clinical Swallow Eval    Oral Prep Phase WFL  -VO        Oral Transit WFL  -VO        Oral Residue WFL  -VO        Pharyngeal Phase no overt signs/symptoms of pharyngeal impairment  -VO        Clinical Swallow Evaluation Summary No overt s/sxs aspiration or esophageal dysphagia w/ trials of thin liquids, puree, or cracker. Did report gastric pain after H20 and cracker. Suspect all issues are gastric related, ok to continue regular diet/thin liquids. Spouse/pt voiced concern w/ nutrition/hydration given pain after eating/drinking and weightloss. Would benefit from dietitian on case. SLP to sign off.  -VO           SLP Evaluation Clinical Impression    SLP Swallowing Diagnosis swallow WFL/no suspected pharyngeal impairment  -VO        Functional Impact no impact on function  -VO        Swallow Criteria for Skilled Therapeutic Interventions Met no problems identified which require skilled intervention  -VO           Recommendations    Therapy Frequency (Swallow) evaluation only  -VO        SLP Diet Recommendation regular textures;thin liquids  -VO        Recommended Precautions and Strategies general aspiration precautions  -VO        Oral Care Recommendations Oral Care BID/PRN;Toothbrush  -VO        SLP Rec. for Method of Medication Administration meds whole;with thin liquids;as tolerated  -VO        Monitor for Signs of Aspiration notify SLP if any concerns  -VO        Anticipated Discharge Disposition (SLP) home  -VO        Demonstrates Need for Referral to Another Service --  dietitian  -VO                  User Key  (r) =  Recorded By, (t) = Taken By, (c) = Cosigned By      Initials Name Effective Dates    VO Stephenie Pereira MA,CCC-SLP 06/16/21 -                     EDUCATION  The patient has been educated in the following areas:   Dysphagia (Swallowing Impairment) Oral Care/Hydration.              Time Calculation:    Time Calculation- SLP       Row Name 10/15/23 1157             Time Calculation- SLP    SLP Start Time 1015  -VO      SLP Received On 10/15/23  -VO         Untimed Charges    04458-WG Eval Oral Pharyng Swallow Minutes 40  -VO         Total Minutes    Untimed Charges Total Minutes 40  -VO       Total Minutes 40  -VO                User Key  (r) = Recorded By, (t) = Taken By, (c) = Cosigned By      Initials Name Provider Type    Stephenie Parra MA,CCC-SLP Speech and Language Pathologist                    Therapy Charges for Today       Code Description Service Date Service Provider Modifiers Qty    [id number]  ST EVAL ORAL PHARYNG SWALLOW 3 10/15/2023 Stephenie Pereira MA,CCC-SLP GN 1                 Stephenie Pereiar MA,CCC-SLP  10/15/2023

## 2023-10-15 NOTE — DISCHARGE SUMMARY
[facility] Medicine Services  DISCHARGE SUMMARY    Patient Name: Bhaskar Torres  : 1955  MRN: [id number]    Date of Admission: 10/13/2023 10:00 AM  Date of Discharge:  ***  Primary Care Physician: Ramesh Trivedi MD    Consults       Date and Time Order Name Status Description    10/13/2023  5:06 PM Inpatient Gastroenterology Consult Completed             Hospital Course     Presenting Problem: ***    Active Hospital Problems    Diagnosis  POA    **Type 2 diabetes mellitus with hyperglycemia [E11.65]  Yes    Oral candidiasis [B37.0]  Yes    Weight loss [R63.4]  Yes      Resolved Hospital Problems   No resolved problems to display.          Hospital Course:  Bhaskar Torres is a 68 y.o. male ***      Discharge Follow Up Recommendations for outpatient labs/diagnostics:   ***    Day of Discharge     HPI:   ***    Review of Systems  ***    Vital Signs:   Temp:  [97.4 °F (36.3 °C)-99.2 °F (37.3 °C)] 98 °F (36.7 °C)  Heart Rate:  [58-78] 71  Resp:  [16-20] 20  BP: (123-168)/(76-96) 146/94  Flow (L/min):  [4] 4      Physical Exam:  ***    Pertinent  and/or Most Recent Results     LAB RESULTS:      Lab 10/15/23  0546 10/13/23  1035   WBC 5.67 8.35   HEMOGLOBIN 14.3 15.4   HEMATOCRIT 41.9 43.6   PLATELETS 132* 157   NEUTROS ABS  --  6.97   IMMATURE GRANS (ABS)  --  0.11*   LYMPHS ABS  --  0.54*   MONOS ABS  --  0.66   EOS ABS  --  0.05   MCV 91.7 92.4   PROCALCITONIN  --  0.10   LACTATE  --  1.7         Lab 10/15/23  0546 10/14/23  0602 10/13/23  2047 10/13/23  1043 10/13/23  1041 10/13/23  1035   SODIUM 144 147* 140  --   --  140   POTASSIUM 3.3* 4.2 4.0  --   --  4.3   CHLORIDE 109* 111* 102  --   --  100   CO2 24.0 30.0* 30.0*  --   --  30.0*   ANION GAP 11.0 6.0 8.0  --   --  10.0   BUN 16 23 26*  --   --  34*   CREATININE 1.00 0.90 1.01 1.30 1.30 1.29*   EGFR 82.0 93.0 81.0  --   --  60.4   GLUCOSE 116* 140* 277*  --   --  447*   CALCIUM 8.8 8.8 8.8  --   --  9.7   MAGNESIUM 1.8 1.9  1.9  --   --   --    PHOSPHORUS  --  2.4*  --   --   --   --    HEMOGLOBIN A1C  --   --   --   --   --  11.30*   TSH  --   --   --   --   --  0.998         Lab 10/13/23  2047 10/13/23  1035   TOTAL PROTEIN  --  6.4   ALBUMIN  --  4.3   GLOBULIN  --  2.1   ALT (SGPT)  --  44*   AST (SGOT)  --  19   BILIRUBIN  --  1.3*   ALK PHOS  --  105   LIPASE 29 36             Lab 10/14/23  0602   CHOLESTEROL 147   LDL CHOL 82   HDL CHOL 36*   TRIGLYCERIDES 167*             Brief Urine Lab Results  (Last result in the past 365 days)        Color   Clarity   Blood   Leuk Est   Nitrite   Protein   CREAT   Urine HCG        10/13/23 1046 Yellow   Cloudy   Small (1+)   Negative   Negative   Negative                 Microbiology Results (last 10 days)       ** No results found for the last 240 hours. **            XR Chest 1 View    Result Date: 10/14/2023  XR CHEST 1 VW Date of Exam: 10/14/2023 5:22 PM EDT Indication: SOA Comparison: None available Findings: The cardiac silhouette is within normal limits. Pulmonary vascularity appears normal. There are low lung volumes with streaky left basilar atelectasis. There is no pleural effusion or pneumothorax. There are degenerative changes of the thoracic spine.     Impression: 1. Low lung volumes with streaky left basilar atelectasis. Electronically Signed: Alonzo Venus  10/14/2023 6:01 PM EDT  Workstation ID: [id number]    CT Abdomen Pelvis With Contrast    Result Date: 10/13/2023  CT ABDOMEN PELVIS W CONTRAST Date of Exam: 10/13/2023 9:49 AM CDT Indication: abd pain, decrease intake. constipation, r/o obstruction.. Comparison: None available. Technique: Axial CT images were obtained of the abdomen and pelvis following the uneventful intravenous administration of Isovue. Reconstructed coronal and sagittal images were also obtained. Automated exposure control and iterative construction methods were used. Exam is limited by patient motion artifact. Findings: LUNG BASES: There is an 8 mm  nodule at the right lung base (image 11 of series 5). LIVER: There are scattered low attenuating lesions throughout the liver are subcentimeter in size too small to characterize. BILIARY/GALLBLADDER:  Unremarkable SPLEEN:  Unremarkable PANCREAS:  Unremarkable ADRENAL:  Unremarkable KIDNEYS:  Unremarkable parenchyma with no solid mass identified. No obstruction.  The right kidney contains an exophytic 7.7 cm cyst. No follow-up imaging necessary. A cyst is present in the lower pole of the left kidney measuring 1.2 cm. No follow-up necessary. GASTROINTESTINAL/MESENTERY: Evaluation of the gastrointestinal tract demonstrates diverticulosis without CT evidence for acute diverticulitis. The appendix is not definitively identified. MESENTERIC VESSELS:  Patent. AORTA/IVC:  Normal caliber. RETROPERITONEUM/LYMPH NODES:  Unremarkable REPRODUCTIVE:  Unremarkable BLADDER:  Unremarkable OSSEUS STRUCTURES: There are scattered vertebral body hemangiomata. Sclerotic densities are present in the posterior left iliac bone.     Impression: 1. Diverticulosis. 2. 8 mm nodule at the right lung base. Recommend a follow-up noncontrast CT thorax to serve as a baseline exam. Electronically Signed: Sandra Joy MD  10/13/2023 10:20 AM CDT  Workstation ID: [id number]                 Plan for Follow-up of Pending Labs/Results: ***  Pending Labs       Order Current Status    Tissue Pathology Exam Collected (10/14/23 9182)    VAR[facility] Comprehensive Panel In process          Discharge Details        Discharge Medications        New Medications        Instructions Start Date   pantoprazole 40 MG EC tablet  Commonly known as: PROTONIX   40 mg, Oral, 2 Times Daily Before Meals             Changes to Medications        Instructions Start Date   glipizide 2.5 MG 24 hr tablet  Commonly known as: GLUCOTROL XL  What changed: how much to take   5 mg, Oral, Daily      nystatin 100,000 unit/mL suspension  Commonly known as: MYCOSTATIN  What changed: how much to  take   500,000 Units, Swish & Swallow, 4 Times Daily             Continue These Medications        Instructions Start Date   allopurinol 100 MG tablet  Commonly known as: ZYLOPRIM   100 mg, Oral, Daily      amLODIPine 5 MG tablet  Commonly known as: NORVASC   5 mg, Oral, Nightly      aspirin 81 MG EC tablet   81 mg, Oral, Daily      atorvastatin 80 MG tablet  Commonly known as: LIPITOR   80 mg, Oral, Daily      loratadine 10 MG tablet  Commonly known as: CLARITIN   10 mg, Oral, Daily PRN      metFORMIN 500 MG tablet  Commonly known as: GLUCOPHAGE   500 mg, Oral, Daily With Breakfast      metoprolol succinate  MG 24 hr tablet  Commonly known as: TOPROL-XL   100 mg, Oral, Daily      ondansetron 4 MG tablet  Commonly known as: ZOFRAN   4 mg, Oral, Every 8 Hours PRN               Allergies   Allergen Reactions    Amoxicillin Other (See Comments)     Causes hair to fall out         Discharge Disposition:  Home or Self Care    Diet:  Hospital:  Diet Order   Procedures    Diet: Liquid Diets, Diabetic Diets; Full Liquid; Consistent Carbohydrate; Fluid Consistency: Thin (IDDSI 0)            Activity:      Restrictions or Other Recommendations:  ***       CODE STATUS:    Code Status and Medical Interventions:   Ordered at: 10/13/23 1440     Code Status (Patient has no pulse and is not breathing):    CPR (Attempt to Resuscitate)     Medical Interventions (Patient has pulse or is breathing):    Full       No future appointments.              Mitzi Gudino, APRN  10/15/23      Time Spent on Discharge:  I spent  ***  minutes on this discharge activity which included: face-to-face encounter with the patient, reviewing the data in the system, coordination of the care with the nursing staff as well as consultants, documentation, and entering orders.

## 2023-10-15 NOTE — PLAN OF CARE
Goal Outcome Evaluation:  Plan of Care Reviewed With: patient, family              SLP evaluation completed. Will sign-off dysphagia. Please see note for further details and recommendations.

## 2023-10-16 LAB
CENTROMERE B AB SER-ACNC: <0.2 AI (ref 0–0.9)
CHROMATIN AB SERPL-ACNC: <0.2 AI (ref 0–0.9)
DSDNA AB SER-ACNC: <1 IU/ML (ref 0–9)
ENA JO1 AB SER-ACNC: <0.2 AI (ref 0–0.9)
ENA RNP AB SER-ACNC: <0.2 AI (ref 0–0.9)
ENA SCL70 AB SER-ACNC: <0.2 AI (ref 0–0.9)
ENA SM AB SER-ACNC: <0.2 AI (ref 0–0.9)
ENA SS-A AB SER-ACNC: <0.2 AI (ref 0–0.9)
ENA SS-B AB SER-ACNC: <0.2 AI (ref 0–0.9)
Lab: NORMAL

## 2023-10-16 NOTE — OUTREACH NOTE
Prep Survey      Flowsheet Row Responses   [facility] facility patient discharged from? [facility]   Is LACE score < 7 ? No   Eligibility Readm Mgmt   Discharge diagnosis Hyperglycemia with type 2 diabetesUpper abdominal pain   Does the patient have one of the following disease processes/diagnoses(primary or secondary)? Other   Does the patient have Home health ordered? No   Is there a DME ordered? No   Prep survey completed? Yes            JORGE COYLE - Registered Nurse

## 2023-10-18 ENCOUNTER — READMISSION MANAGEMENT (OUTPATIENT)
Dept: CALL CENTER | Facility: HOSPITAL | Age: 68
End: 2023-10-18
Payer: MEDICARE

## 2023-10-18 LAB
CYTO UR: NORMAL
LAB AP CASE REPORT: NORMAL
LAB AP CLINICAL INFORMATION: NORMAL
PATH REPORT.FINAL DX SPEC: NORMAL
PATH REPORT.GROSS SPEC: NORMAL

## 2023-10-18 NOTE — OUTREACH NOTE
Medical Week 1 Survey      Flowsheet Row Responses   [facility] patient discharged from? [place]   Does the patient have one of the following disease processes/diagnoses(primary or secondary)? Other   Week 1 attempt successful? No   Unsuccessful attempts Attempt 1            Leslie RAMOS - Registered Nurse

## 2023-10-20 ENCOUNTER — READMISSION MANAGEMENT (OUTPATIENT)
Dept: CALL CENTER | Facility: HOSPITAL | Age: 68
End: 2023-10-20
Payer: MEDICARE

## 2023-10-20 NOTE — OUTREACH NOTE
Medical Week 1 Survey      Flowsheet Row Responses   [facility] patient discharged from? [place]   Does the patient have one of the following disease processes/diagnoses(primary or secondary)? Other   Week 1 attempt successful? Yes   Call start time 1626   Call end time 1635   Discharge diagnosis Hyperglycemia with type 2 diabetes, Upper abdominal pain   Person spoke with today (if not patient) and relationship Patient   Meds reviewed with patient/caregiver? Yes   Does the patient have all medications ordered at discharge? Yes   Is the patient taking all medications as directed (includes completed medication regime)? Yes   Medication comments Patient reports that PCP ordered insulin pens but he could not afford them. Reports >$500   Does the patient have a primary care provider?  Yes   Comments regarding PCP Ramesh Trivedi PCP. Patient reports that he has seen PCP since discharge.   Has the patient kept scheduled appointments due by today? Yes   Comments Patient reports that he has appt in place with endocrinologist and with diabetic educator   Has home health visited the patient within 72 hours of discharge? N/A   Psychosocial issues? No   Did the patient receive a copy of their discharge instructions? Yes   Nursing interventions Reviewed instructions with patient   What is the patient's perception of their health status since discharge? Improving   Is the patient/caregiver able to teach back signs and symptoms related to disease process for when to call PCP? Yes   Is the patient/caregiver able to teach back signs and symptoms related to disease process for when to call 911? Yes   Is the patient/caregiver able to teach back the hierarchy of who to call/visit for symptoms/problems? PCP, Specialist, Home health nurse, Urgent Care, ED, 911 Yes   If the patient is a current smoker, are they able to teach back resources for cessation? Not a smoker   Week 1 call completed? Yes   Would this patient benefit from a  Referral to [facility] Social Work? No   Is the patient interested in additional calls from an ambulatory ? No   Wrap up additional comments Patient reports that his blood sugar today was 144. Reports that he is doing better with drinking water.   Call end time 1635            YOLY LAMBERT - Registered Nurse

## 2023-10-23 NOTE — PROGRESS NOTES
Please let patient know:    Biopsies from the stomach show benign inflammation and no evidence of H. pylori.  Biopsies from the esophagus show benign inflammation with no evidence of infection.    Recommend course on proton pump inhibitor.  Follow-up with your primary care provider as scheduled.

## 2023-11-07 ENCOUNTER — HOSPITAL ENCOUNTER (OUTPATIENT)
Dept: DIABETES SERVICES | Facility: HOSPITAL | Age: 68
Setting detail: RECURRING SERIES
Discharge: HOME | End: 2023-11-07
Payer: MEDICARE

## 2023-11-07 PROCEDURE — G0109 DIAB MANAGE TRN IND/GROUP: HCPCS

## 2023-11-07 NOTE — CONSULTS
Patient attended the scheduled 120-minute diabetes education class with RN and RD. Please see media tab for assessment and notes if you use EPIC. If you are not an EPIC user a copy of patient's assessment and notes will be sent per routine. Thank you.

## 2023-12-05 ENCOUNTER — HOSPITAL ENCOUNTER (OUTPATIENT)
Dept: DIABETES SERVICES | Facility: HOSPITAL | Age: 68
Setting detail: RECURRING SERIES
Discharge: HOME | End: 2023-12-05
Payer: MEDICARE

## 2023-12-05 NOTE — PLAN OF CARE
Patient attended the scheduled 30 minute diabetes education follow up class. Please see media tab for assessment and notes if you use EPIC. If you are not an EPIC user a copy of patient's assessment and notes will be sent per routine. Thank you.

## 2024-01-22 RX ORDER — SODIUM, POTASSIUM,MAG SULFATES 17.5-3.13G
2 SOLUTION, RECONSTITUTED, ORAL ORAL TAKE AS DIRECTED
Qty: 354 ML | Refills: 0 | Status: SHIPPED | OUTPATIENT
Start: 2024-01-22

## 2024-01-29 ENCOUNTER — LAB REQUISITION (OUTPATIENT)
Dept: LAB | Facility: HOSPITAL | Age: 69
End: 2024-01-29
Payer: MEDICARE

## 2024-01-29 ENCOUNTER — OUTSIDE FACILITY SERVICE (OUTPATIENT)
Dept: GASTROENTEROLOGY | Facility: CLINIC | Age: 69
End: 2024-01-29
Payer: MEDICARE

## 2024-01-29 DIAGNOSIS — D12.4 BENIGN NEOPLASM OF DESCENDING COLON: ICD-10-CM

## 2024-01-29 DIAGNOSIS — K64.8 OTHER HEMORRHOIDS: ICD-10-CM

## 2024-01-29 DIAGNOSIS — Z86.010 PERSONAL HISTORY OF COLONIC POLYPS: ICD-10-CM

## 2024-01-29 DIAGNOSIS — K57.30 DIVERTICULOSIS OF LARGE INTESTINE WITHOUT PERFORATION OR ABSCESS WITHOUT BLEEDING: ICD-10-CM

## 2024-01-29 DIAGNOSIS — D12.2 BENIGN NEOPLASM OF ASCENDING COLON: ICD-10-CM

## 2024-01-29 DIAGNOSIS — Z12.11 ENCOUNTER FOR SCREENING FOR MALIGNANT NEOPLASM OF COLON: ICD-10-CM

## 2024-01-29 DIAGNOSIS — D12.5 BENIGN NEOPLASM OF SIGMOID COLON: ICD-10-CM

## 2024-01-29 PROCEDURE — 88305 TISSUE EXAM BY PATHOLOGIST: CPT | Performed by: INTERNAL MEDICINE

## 2024-04-25 ENCOUNTER — OFFICE VISIT (OUTPATIENT)
Dept: PULMONOLOGY | Facility: CLINIC | Age: 69
End: 2024-04-25
Payer: MEDICARE

## 2024-04-25 VITALS
OXYGEN SATURATION: 97 % | SYSTOLIC BLOOD PRESSURE: 136 MMHG | WEIGHT: 241 LBS | BODY MASS INDEX: 32.64 KG/M2 | DIASTOLIC BLOOD PRESSURE: 76 MMHG | HEART RATE: 64 BPM | TEMPERATURE: 97.8 F | HEIGHT: 72 IN

## 2024-04-25 DIAGNOSIS — R91.1 INCIDENTAL LUNG NODULE, GREATER THAN OR EQUAL TO 8MM: ICD-10-CM

## 2024-04-25 DIAGNOSIS — R06.02 SOB (SHORTNESS OF BREATH): Primary | ICD-10-CM

## 2024-04-25 NOTE — PROGRESS NOTES
Initial Pulmonary Consult Note    Subjective   Reason for consultation/Chief Complaint: Hypoxia    Bhaskar Torres is a 68 y.o. male is being seen for consultation today at the request of Ramesh Trivedi MD    History of Present Illness    Mr. Torres is a 67yo M who is referred for possible hypoxia. He reports that he was noted to be hypoxic after a surgery. He has a pulse-ox at home and feels that his oxygen drops into the 70s sometimes. He is not sure if there is a good pleth. His oxygenation recovers without any intervention.     Active Ambulatory Problems     Diagnosis Date Noted    Primary cancer of unknown site 10/12/2021    Abnormal bone scan of thoracic spine 10/12/2021    Lesion of liver 10/12/2021    Type 2 diabetes mellitus with hyperglycemia 10/13/2023    Oral candidiasis 10/13/2023    Weight loss 10/13/2023     Resolved Ambulatory Problems     Diagnosis Date Noted    No Resolved Ambulatory Problems     Past Medical History:   Diagnosis Date    Arthritis     Asthma     Bronchitis     Diabetes mellitus     Gout     Hypertension     Kidney stones     Prostate cancer        Past Surgical History:   Procedure Laterality Date    ADENOIDECTOMY      ENDOSCOPY N/A 10/14/2023    Procedure: ESOPHAGOGASTRODUODENOSCOPY;  Surgeon: Samy Real MD;  Location: [facility] ENDOSCOPY;  Service: Gastroenterology;  Laterality: N/A;    HERNIA REPAIR      PROSTATECTOMY      THYROID SURGERY Left     TONSILLECTOMY         Family History   Problem Relation Age of Onset    Heart disease Father     Pancreatic cancer Maternal Grandfather 72       Social History     Socioeconomic History    Marital status: Unknown   Tobacco Use    Smoking status: Never     Passive exposure: Never    Smokeless tobacco: Never   Vaping Use    Vaping status: Never Used   Substance and Sexual Activity    Alcohol use: Not Currently     Comment: 35oz per week    Drug use: Never    Sexual activity: Defer       Allergies   Allergen Reactions    Amoxicillin  "Other (See Comments)     Causes hair to fall out       Current Outpatient Medications   Medication Sig Dispense Refill    allopurinol (ZYLOPRIM) 100 MG tablet Take 1 tablet by mouth Daily.      amLODIPine (NORVASC) 5 MG tablet Take 1 tablet by mouth Every Night.      aspirin 81 MG EC tablet Take 1 tablet by mouth Daily.      atorvastatin (LIPITOR) 80 MG tablet Take 1 tablet by mouth Daily.      loratadine (CLARITIN) 10 MG tablet Take 1 tablet by mouth Daily As Needed.      metoprolol succinate XL (TOPROL-XL) 100 MG 24 hr tablet Take 1 tablet by mouth Daily.      pantoprazole (PROTONIX) 40 MG EC tablet Take 1 tablet by mouth 2 (Two) Times a Day Before Meals. 60 tablet 0     No current facility-administered medications for this visit.       Review of Systems  All other systems were reviewed and are negative.  Exceptions are included in the HPI or as otherwise noted above.     Objective   Blood pressure 136/76, pulse 64, temperature 97.8 °F (36.6 °C), height 182.9 cm (72\"), weight 109 kg (241 lb), SpO2 97%.  Physical Exam  Vitals and nursing note reviewed.   Constitutional:       General: He is not in acute distress.     Appearance: He is well-developed.   HENT:      Head: Normocephalic and atraumatic.   Eyes:      General: No scleral icterus.     Conjunctiva/sclera: Conjunctivae normal.      Pupils: Pupils are equal, round, and reactive to light.   Neck:      Thyroid: No thyromegaly.      Trachea: No tracheal deviation.   Cardiovascular:      Rate and Rhythm: Normal rate and regular rhythm.      Heart sounds: Normal heart sounds.   Pulmonary:      Effort: Pulmonary effort is normal. No respiratory distress.      Breath sounds: Normal breath sounds.   Abdominal:      General: Bowel sounds are normal.      Palpations: Abdomen is soft.      Tenderness: There is no abdominal tenderness.   Musculoskeletal:         General: Normal range of motion.      Cervical back: Normal range of motion and neck supple. " "  Lymphadenopathy:      Cervical: No cervical adenopathy.   Skin:     General: Skin is warm and dry.      Findings: No erythema or rash.   Neurological:      Mental Status: He is alert and oriented to person, place, and time.      Motor: No abnormal muscle tone.      Coordination: Coordination normal.   Psychiatric:         Speech: Speech normal.         Behavior: Behavior normal.         Judgment: Judgment normal.         PFTs:  Performed in clinic and personally reviewed.   There is no airway obstruction.  The lung volumes are normal.   The DLCO is normal.     Imaging:  Chest xray from 10/14/23 showed low lung volumes with left basilar atelectasis.     Assessment & Plan   Diagnoses and all orders for this visit:    1. SOB (shortness of breath) (Primary)  -     Spirometry with Diffusion Capacity & Lung Volumes    2. Incidental lung nodule, greater than or equal to 8mm  -     CT Chest Without Contrast; Future        Discussion:  Mr. Torres is a 67yo M who is referred for possible hypoxia.     1. Possible Hypoxia  - I have reassured him that his PFTs and oxygenation are normal in clinic today.   - I have counseled him to ensure that his home Pulse-ox has a good pleth (I explained what this is) as I suspect his episodes of \"low oxygen\" are really just inaccurate readings.     2. Pulmonary Nodule  - CT Abdomen/Pelvis from October 2023 noted a 8mm nodule at the right lung base. This previously measured 6mm in 2021.   - We will get another CT Chest now to compare.          Bhaskar Torres  reports that he has never smoked. He has never been exposed to tobacco smoke. He has never used smokeless tobacco.       I have spent 62 minutes reviewing the patient record, face to face with the patient in discussion of test results and plans for further management and in documentation and coordination of care. Excluding time spent on other separate services such as performing procedures or test interpretation, if " applicable.        Dorothy V Case, DO  Pulmonary and Critical Care Medicine  Note Electronically Signed

## 2024-05-10 DIAGNOSIS — R91.1 INCIDENTAL LUNG NODULE, GREATER THAN OR EQUAL TO 8MM: ICD-10-CM

## 2025-03-13 ENCOUNTER — APPOINTMENT (OUTPATIENT)
Dept: GENERAL RADIOLOGY | Facility: HOSPITAL | Age: 70
End: 2025-03-13
Payer: MEDICARE

## 2025-03-13 ENCOUNTER — HOSPITAL ENCOUNTER (EMERGENCY)
Facility: HOSPITAL | Age: 70
Discharge: HOME | End: 2025-03-13
Attending: EMERGENCY MEDICINE
Payer: MEDICARE

## 2025-03-13 VITALS
HEART RATE: 53 BPM | RESPIRATION RATE: 18 BRPM | BODY MASS INDEX: 35.76 KG/M2 | OXYGEN SATURATION: 96 % | HEIGHT: 72 IN | TEMPERATURE: 97.6 F | DIASTOLIC BLOOD PRESSURE: 94 MMHG | SYSTOLIC BLOOD PRESSURE: 153 MMHG | WEIGHT: 264 LBS

## 2025-03-13 DIAGNOSIS — R73.9 BLOOD GLUCOSE ELEVATED: ICD-10-CM

## 2025-03-13 DIAGNOSIS — R07.9 NONSPECIFIC CHEST PAIN: Primary | ICD-10-CM

## 2025-03-13 DIAGNOSIS — R03.0 ELEVATED BLOOD PRESSURE READING: ICD-10-CM

## 2025-03-13 LAB
ALBUMIN SERPL-MCNC: 4.2 G/DL (ref 3.5–5.2)
ALBUMIN/GLOB SERPL: 1.8 G/DL
ALP SERPL-CCNC: 107 U/L (ref 39–117)
ALT SERPL W P-5'-P-CCNC: 22 U/L (ref 1–41)
ANION GAP SERPL CALCULATED.3IONS-SCNC: 10 MMOL/L (ref 5–15)
AST SERPL-CCNC: 23 U/L (ref 1–40)
BASOPHILS # BLD AUTO: 0.02 10*3/MM3 (ref 0–0.2)
BASOPHILS NFR BLD AUTO: 0.3 % (ref 0–1.5)
BILIRUB SERPL-MCNC: 0.5 MG/DL (ref 0–1.2)
BUN SERPL-MCNC: 20 MG/DL (ref 8–23)
BUN/CREAT SERPL: 21.3 (ref 7–25)
CALCIUM SPEC-SCNC: 9.5 MG/DL (ref 8.6–10.5)
CHLORIDE SERPL-SCNC: 103 MMOL/L (ref 98–107)
CO2 SERPL-SCNC: 26 MMOL/L (ref 22–29)
CREAT SERPL-MCNC: 0.94 MG/DL (ref 0.76–1.27)
DEPRECATED RDW RBC AUTO: 43.5 FL (ref 37–54)
EGFRCR SERPLBLD CKD-EPI 2021: 87.8 ML/MIN/1.73
EOSINOPHIL # BLD AUTO: 0.13 10*3/MM3 (ref 0–0.4)
EOSINOPHIL NFR BLD AUTO: 2.3 % (ref 0.3–6.2)
ERYTHROCYTE [DISTWIDTH] IN BLOOD BY AUTOMATED COUNT: 13.1 % (ref 12.3–15.4)
GLOBULIN UR ELPH-MCNC: 2.3 GM/DL
GLUCOSE SERPL-MCNC: 263 MG/DL (ref 65–99)
HBA1C MFR BLD: 7.9 % (ref 4.8–5.6)
HCT VFR BLD AUTO: 43.6 % (ref 37.5–51)
HGB BLD-MCNC: 14.6 G/DL (ref 13–17.7)
HOLD SPECIMEN: NORMAL
IMM GRANULOCYTES # BLD AUTO: 0.03 10*3/MM3 (ref 0–0.05)
IMM GRANULOCYTES NFR BLD AUTO: 0.5 % (ref 0–0.5)
LIPASE SERPL-CCNC: 47 U/L (ref 13–60)
LYMPHOCYTES # BLD AUTO: 0.82 10*3/MM3 (ref 0.7–3.1)
LYMPHOCYTES NFR BLD AUTO: 14.3 % (ref 19.6–45.3)
MCH RBC QN AUTO: 30.5 PG (ref 26.6–33)
MCHC RBC AUTO-ENTMCNC: 33.5 G/DL (ref 31.5–35.7)
MCV RBC AUTO: 91.2 FL (ref 79–97)
MONOCYTES # BLD AUTO: 0.45 10*3/MM3 (ref 0.1–0.9)
MONOCYTES NFR BLD AUTO: 7.8 % (ref 5–12)
NEUTROPHILS NFR BLD AUTO: 4.3 10*3/MM3 (ref 1.7–7)
NEUTROPHILS NFR BLD AUTO: 74.8 % (ref 42.7–76)
NRBC BLD AUTO-RTO: 0 /100 WBC (ref 0–0.2)
NT-PROBNP SERPL-MCNC: 137.2 PG/ML (ref 0–900)
PLATELET # BLD AUTO: 158 10*3/MM3 (ref 140–450)
PMV BLD AUTO: 10.4 FL (ref 6–12)
POTASSIUM SERPL-SCNC: 4.5 MMOL/L (ref 3.5–5.2)
PROT SERPL-MCNC: 6.5 G/DL (ref 6–8.5)
QT INTERVAL: 434 MS
QTC INTERVAL: 429 MS
RBC # BLD AUTO: 4.78 10*6/MM3 (ref 4.14–5.8)
SODIUM SERPL-SCNC: 139 MMOL/L (ref 136–145)
TROPONIN T SERPL HS-MCNC: 15 NG/L
TSH SERPL DL<=0.05 MIU/L-ACNC: 1.19 UIU/ML (ref 0.27–4.2)
WBC NRBC COR # BLD AUTO: 5.75 10*3/MM3 (ref 3.4–10.8)
WHOLE BLOOD HOLD COAG: NORMAL
WHOLE BLOOD HOLD SPECIMEN: NORMAL

## 2025-03-13 PROCEDURE — 85025 COMPLETE CBC W/AUTO DIFF WBC: CPT | Performed by: EMERGENCY MEDICINE

## 2025-03-13 PROCEDURE — 83880 ASSAY OF NATRIURETIC PEPTIDE: CPT | Performed by: EMERGENCY MEDICINE

## 2025-03-13 PROCEDURE — 70360 X-RAY EXAM OF NECK: CPT

## 2025-03-13 PROCEDURE — 83036 HEMOGLOBIN GLYCOSYLATED A1C: CPT

## 2025-03-13 PROCEDURE — 93005 ELECTROCARDIOGRAM TRACING: CPT

## 2025-03-13 PROCEDURE — 71045 X-RAY EXAM CHEST 1 VIEW: CPT

## 2025-03-13 PROCEDURE — 84484 ASSAY OF TROPONIN QUANT: CPT | Performed by: EMERGENCY MEDICINE

## 2025-03-13 PROCEDURE — 84443 ASSAY THYROID STIM HORMONE: CPT

## 2025-03-13 PROCEDURE — 93005 ELECTROCARDIOGRAM TRACING: CPT | Performed by: EMERGENCY MEDICINE

## 2025-03-13 PROCEDURE — 99284 EMERGENCY DEPT VISIT MOD MDM: CPT

## 2025-03-13 PROCEDURE — 80053 COMPREHEN METABOLIC PANEL: CPT | Performed by: EMERGENCY MEDICINE

## 2025-03-13 PROCEDURE — 83690 ASSAY OF LIPASE: CPT | Performed by: EMERGENCY MEDICINE

## 2025-03-13 RX ORDER — ASPIRIN 81 MG/1
324 TABLET, CHEWABLE ORAL ONCE
Status: COMPLETED | OUTPATIENT
Start: 2025-03-13 | End: 2025-03-13

## 2025-03-13 RX ORDER — ALUMINA, MAGNESIA, AND SIMETHICONE 2400; 2400; 240 MG/30ML; MG/30ML; MG/30ML
30 SUSPENSION ORAL ONCE
Status: COMPLETED | OUTPATIENT
Start: 2025-03-13 | End: 2025-03-13

## 2025-03-13 RX ORDER — SODIUM CHLORIDE 0.9 % (FLUSH) 0.9 %
10 SYRINGE (ML) INJECTION AS NEEDED
Status: DISCONTINUED | OUTPATIENT
Start: 2025-03-13 | End: 2025-03-13 | Stop reason: HOSPADM

## 2025-03-13 RX ADMIN — ALUMINUM HYDROXIDE, MAGNESIUM HYDROXIDE, DIMETHICONE 30 ML: 400; 400; 40 SUSPENSION ORAL at 14:22

## 2025-03-13 RX ADMIN — ASPIRIN 324 MG: 81 TABLET, CHEWABLE ORAL at 11:44

## 2025-03-13 NOTE — ED PROVIDER NOTES
Subjective   History of Present Illness patient is a 69-year-old gentleman accompanied by his wife, presents as referred by his primary care provider today.  He is complaining of left and right sided chest pain of different characteristics over the last 1+ month, which she describes as a sharp squeezing on the left side 3 times per day on average, as well as electrical shocklike symptoms on the right side.  He also endorses dyspnea on exertion worsening over the last several months, including bilateral lower extremity pain, stopping to catch his breath when climbing stairs.  In addition he reports elevated blood glucose readings, as he has been switched to insulin administration approximately 1 month prior, increased abdominal girth, and finally noting that he is having dysphagia with foodstuffs and vomiting them back up, in the absence of abdominal or epigastric pain or history of GERD.  He follows with gastroenterology, primary care to manage his blood pressure which is also been elevated over the last 1 month, but does not have a cardiologist as of yet.  He reports prior prostatectomy in the 90s, as well as partial thyroidectomy around 2008 patient is awake, alert, nontoxic-appearing.    Review of Systems   Constitutional:  Positive for unexpected weight change.        10 pounds weight gain in the last 1 month, diabetic medication regimen changed during that.   HENT:  Positive for trouble swallowing.    Respiratory:  Positive for chest tightness.    Cardiovascular:  Positive for chest pain.   Gastrointestinal: Negative.    Genitourinary:  Positive for frequency.   Musculoskeletal: Negative.    Skin: Negative.        Past Medical History:   Diagnosis Date    Arthritis     Asthma     Bronchitis     Diabetes mellitus     Gout     Hypertension     Kidney stones     Prostate cancer        Allergies   Allergen Reactions    Amoxicillin Other (See Comments)     Causes hair to fall out    Glipizide Other (See Comments)      Weight gain    Ct Contrast Other (See Comments)     Body aches    Metformin Other (See Comments)     constipation       Past Surgical History:   Procedure Laterality Date    ADENOIDECTOMY      ENDOSCOPY N/A 10/14/2023    Procedure: ESOPHAGOGASTRODUODENOSCOPY;  Surgeon: Samy Real MD;  Location: [facility] ENDOSCOPY;  Service: Gastroenterology;  Laterality: N/A;    HERNIA REPAIR      PROSTATECTOMY      THYROID SURGERY Left     TONSILLECTOMY         Family History   Problem Relation Age of Onset    Heart disease Father     Pancreatic cancer Maternal Grandfather 72       Social History     Socioeconomic History    Marital status: Unknown   Tobacco Use    Smoking status: Never     Passive exposure: Never    Smokeless tobacco: Never   Vaping Use    Vaping status: Never Used   Substance and Sexual Activity    Alcohol use: Not Currently     Comment: 35oz per week    Drug use: Never    Sexual activity: Defer           Objective   Physical Exam  Constitutional:       General: He is not in acute distress.     Appearance: He is well-developed. He is obese. He is not toxic-appearing.   HENT:      Head: Normocephalic.   Eyes:      Extraocular Movements: Extraocular movements intact.      Pupils: Pupils are equal, round, and reactive to light.   Neck:      Thyroid: No thyromegaly.      Vascular: No JVD.      Trachea: No tracheal deviation.   Cardiovascular:      Rate and Rhythm: Bradycardia present.      Heart sounds: Normal heart sounds.   Pulmonary:      Effort: Pulmonary effort is normal.      Breath sounds: Normal breath sounds.   Chest:      Chest wall: No mass or tenderness.   Abdominal:      General: Bowel sounds are normal.      Palpations: Abdomen is soft.   Musculoskeletal:         General: Normal range of motion.      Cervical back: Normal range of motion and neck supple.      Right lower leg: No edema.      Left lower leg: No edema.   Lymphadenopathy:      Cervical: No cervical adenopathy.   Skin:     General: Skin  is warm and dry.      Capillary Refill: Capillary refill takes less than 2 seconds.   Neurological:      General: No focal deficit present.      Mental Status: He is alert and oriented to person, place, and time.   Psychiatric:         Mood and Affect: Mood normal.         Behavior: Behavior normal.         Procedures           ED Course  ED Course as of 03/13/25 1444   u Mar 13, 2025   1149 Patient initially evaluated, accompanied by his wife, ambulates without difficulty, in the ED pit area for [JH]   1216 Patient escorted to the ED registration area.  CBC within normal limits. [JH]   1237 Cardiac troponin and BNP both negative. [JH]   1343 Serum chemistry with hyperglycemia as expected by the patient, A1c of 3/10 in the last 3 months, lipase negative. [JH]   1345 On my interpretation, patient's plain film imaging of the chest without focal opacity, consolidation, effusion, or other abnormality. [JH]   1345 Plain film of patient's neck without acute abnormality. [JH]   1441 I reevaluated the patient in ED waiting room.  He tolerated the oral medication without difficulty, he is agreeable with the discharge plan to follow-up with his primary care provider, gastroenterologist, and consideration for attending the chest pain clinic, although he reports he is reticent to do so [JH]      ED Course User Index  [JH] Lucas Daigle, TOMASZ                                                       Medical Decision Making  The patient's complaints, their chronicity, and his health changes, as well as his vital signs today, and my exam, differential includes accelerated hypertension, poor blood pressure control, need for medication management, in addition to the possibility of electrolyte derangement, including glycemic derangement, acute kidney injury, and I cannot exclude acute coronary syndrome, congestive heart failure, however those are less likely.  In addition the patient will have serum screening labs, twelve-lead ECG,  plain film imaging of the chest, ongoing cardiac telemetry monitoring, and reevaluation for improvement.  Patient is agreeable with this plan.  Will consider referral to his gastroenterologist establish, as well as to establish cardiology care, with a chest pain clinic follow-up.  He will also follow-up with his primary care provider, reporting his blood pressure levels, for medication titration.    Amount and/or Complexity of Data Reviewed  Labs: ordered.  Radiology: ordered.  ECG/medicine tests: ordered.    Risk  OTC drugs.  Prescription drug management.        Final diagnoses:   Nonspecific chest pain   Elevated blood pressure reading   Blood glucose elevated       ED Disposition  ED Disposition       ED Disposition   Discharge    Condition   Stable    Comment   --               Ramesh Trivedi MD  [address] [facility] [address]  [address]  [place] [id number]  [phone]    Go to       Sadiq Farfan DPM  [address]  [address]  [place] [id number] [phone]      As needed    Marjorie Diego, PA-C  [address]  [address]  [place] [id number] [phone]    Go to       [facility], TOMASZ  [address]  [address]  [place] [id number] [phone]               Medication List      No changes were made to your prescriptions during this visit.            Lucas Daigle, APRN  03/13/25 9000

## 2025-03-24 ENCOUNTER — OFFICE VISIT (OUTPATIENT)
Dept: CARDIOLOGY | Facility: HOSPITAL | Age: 70
End: 2025-03-24
Payer: MEDICARE

## 2025-03-24 VITALS
RESPIRATION RATE: 16 BRPM | BODY MASS INDEX: 35.35 KG/M2 | HEIGHT: 72 IN | OXYGEN SATURATION: 90 % | WEIGHT: 261 LBS | DIASTOLIC BLOOD PRESSURE: 72 MMHG | HEART RATE: 55 BPM | SYSTOLIC BLOOD PRESSURE: 128 MMHG

## 2025-03-24 DIAGNOSIS — R06.09 DYSPNEA ON EXERTION: ICD-10-CM

## 2025-03-24 DIAGNOSIS — R07.89 OTHER CHEST PAIN: Primary | ICD-10-CM

## 2025-03-24 DIAGNOSIS — E78.49 OTHER HYPERLIPIDEMIA: ICD-10-CM

## 2025-03-24 DIAGNOSIS — I10 PRIMARY HYPERTENSION: ICD-10-CM

## 2025-03-24 PROCEDURE — 1159F MED LIST DOCD IN RCRD: CPT | Performed by: NURSE PRACTITIONER

## 2025-03-24 PROCEDURE — 1160F RVW MEDS BY RX/DR IN RCRD: CPT | Performed by: NURSE PRACTITIONER

## 2025-03-24 PROCEDURE — 99213 OFFICE O/P EST LOW 20 MIN: CPT | Performed by: NURSE PRACTITIONER

## 2025-03-24 RX ORDER — INSULIN LISPRO 100 [IU]/ML
10 INJECTION, SOLUTION INTRAVENOUS; SUBCUTANEOUS
COMMUNITY
Start: 2025-01-29 | End: 2026-01-29

## 2025-03-24 NOTE — PROGRESS NOTES
Chief Complaint  Chest Pain    Subjective      History of Present Illness {  Problem List  Visit  Diagnosis   Encounters  Notes  Medications  Labs  Result Review Imaging  Media :23}     Bhaskar Torres, 69 y.o. male with past medical history significant for arthritis, asthma, diabetes, gout, hypertension, and prostate cancer, who presents to [facility] for Chest Pain  Patient presented to [facility] ED on 3/13/2025 with a 1 month history of chest pain.  Patient was referred by his primary care provider after complaining of left and right sided chest pain.  At times pain was described as sharp squeezing on the left, as well as electrical shock sensations on the right.  Patient also endorsed ongoing and worsening dyspnea on exertion over the last several months with lower extremity edema.  Of note, patient did have a 10 pound weight gain over the last month with adjustment of diabetic medications.  Twelve-lead EKG showed sinus bradycardia, rate 59, no acute ST segment changes.  Chest x-ray showed no acute process.  Single high-sensitivity troponin noted to be unremarkable.  Patient was treated while in the ED with aspirin, and Maalox.  He was discharged with instructions to follow-up with heart and valve for further evaluation of chest discomfort.    Since time of evaluation the ED, patient continues to endorse daily episodes of chest discomfort.  He continues to express concerns for various chest pains.  He has a pressure on the left side of his chest with radiation down his left arm.  He also endorses a sharp pain on the right side of his chest.  He notices discomfort predominantly when he is going to bed at night.  There are no other clear exacerbating or alleviating factors.  Patient has noticed dyspnea on exertion recently which is relatively new.  He denies any syncope, palpitations, or lower extremity edema.  He does endorse dizziness with position changes,  "and states the \"blood rushes to his head\" when he bends over.  Patient is compliant with monitoring his blood pressure at home which he states has been elevated.      Objective     Vital Signs:   Vitals:    03/24/25 1313 03/24/25 1314   BP: 137/76 128/72   BP Location: Left arm Left arm   Patient Position: Standing Sitting   Cuff Size: Adult Adult   Pulse: 55 55   Resp:  16   SpO2: 96% 90%   Weight:  118 kg (261 lb)   Height:  182.9 cm (72\")     Body mass index is 35.4 kg/m².  Physical Exam  Vitals and nursing note reviewed.   Constitutional:       Appearance: Normal appearance.   HENT:      Head: Normocephalic.   Eyes:      Pupils: Pupils are equal, round, and reactive to light.   Cardiovascular:      Rate and Rhythm: Normal rate and regular rhythm.      Pulses: Normal pulses.      Heart sounds: Normal heart sounds. No murmur heard.  Pulmonary:      Effort: Pulmonary effort is normal.      Breath sounds: Normal breath sounds.   Abdominal:      General: Bowel sounds are normal.      Palpations: Abdomen is soft.   Musculoskeletal:         General: Normal range of motion.      Cervical back: Normal range of motion.      Right lower leg: No edema.      Left lower leg: No edema.   Skin:     General: Skin is warm and dry.      Capillary Refill: Capillary refill takes less than 2 seconds.   Neurological:      Mental Status: He is alert and oriented to person, place, and time.   Psychiatric:         Mood and Affect: Mood normal.         Thought Content: Thought content normal.                Data Reviewed:{ Labs  Result Review  Imaging  Med Tab  Media :23}     Lab Results   Component Value Date    WBC 5.75 03/13/2025    HGB 14.6 03/13/2025    HCT 43.6 03/13/2025    MCV 91.2 03/13/2025     03/13/2025      Lab Results   Component Value Date    GLUCOSE 263 (H) 03/13/2025    BUN 20 03/13/2025    CREATININE 0.94 03/13/2025     03/13/2025    K 4.5 03/13/2025     03/13/2025    CALCIUM 9.5 03/13/2025    " "PROTEINTOT 6.5 03/13/2025    ALBUMIN 4.2 03/13/2025    ALT 22 03/13/2025    AST 23 03/13/2025    ALKPHOS 107 03/13/2025    BILITOT 0.5 03/13/2025    GLOB 2.3 03/13/2025    AGRATIO 1.8 03/13/2025    BCR 21.3 03/13/2025    ANIONGAP 10.0 03/13/2025    EGFR 87.8 03/13/2025      Lab Results   Component Value Date    CHOL 147 10/14/2023    TRIG 167 (H) 10/14/2023    HDL 36 (L) 10/14/2023    LDL 82 10/14/2023      Lab Results   Component Value Date    TROPONINT 15 03/13/2025    XR Chest 1 View (03/13/2025 12:49)   ECG 12 Lead ED Triage Standing Order; Chest Pain (03/13/2025 11:36)   CT Chest Without Contrast (05/09/2024)   Assessment & Plan   Assessment and Plan {CC Problem List  Visit Diagnosis  ROS  Review (Popup)  fintonic Maintenance  Quality  BestPractice  Medications  SmartSets  SnapShot Encounters  Media :23}     1. Other chest pain  -Recent episodes of chest discomfort without exertional component.  Patient is having left chest heaviness with radiation into the left arm warranting further evaluation.  Patient states for many years he has been told he has had a \"silent heart attack\" based on EKG findings.  -We have reviewed and discussed most recent ED evaluation including EKG, chest x-ray, and laboratory results.  We also discussed prior CT scan of the chest which mentions possible coronary artery disease  -Will plan to proceed with ischemic evaluation in the form of nuclear medicine treadmill stress test to assess her ischemic cause.  -Will also obtain echocardiogram to rule out structural or functional abnormalities  - Stress Test With Myocardial Perfusion (1 Day); Future  - Adult Transthoracic Echo Complete W/ Cont if Necessary Per Protocol; Future    2. Dyspnea on exertion  -Patient notices a new onset dyspnea on exertion.  In combination with left-sided chest discomfort, would consider this as an anginal equivalent requiring further evaluation  -As mentioned above, plan is for patient to undergo " ischemic evaluation in form of nuclear medicine stress testing, and echocardiogram  - Stress Test With Myocardial Perfusion (1 Day); Future  - Adult Transthoracic Echo Complete W/ Cont if Necessary Per Protocol; Future    3. Primary hypertension  -Blood pressure appears to be well-controlled according to today's evaluation.  Patient states at home his blood pressure is higher per upper arm blood pressure cuff  -For now, recommend patient to continue amlodipine 5 mg daily, and metoprolol succinate 100 mg daily.  Could consider increasing amlodipine to 10 mg pending results of ambulatory blood pressure monitoring.  Patient has close schedule follow-up with primary care for further discussion of blood pressure  - Stress Test With Myocardial Perfusion (1 Day); Future  - Adult Transthoracic Echo Complete W/ Cont if Necessary Per Protocol; Future    4. Other hyperlipidemia  -Lipid panel currently managed by primary care.  Recommend to continue atorvastatin 80 mg daily in combination with aspirin 81 mg daily.      Will plan to follow-up with patient in approximately 4 weeks to discuss results of ambulatory blood pressure monitoring, and results of cardiac testing.  Patient has close continue follow-up also scheduled with primary care.    Follow Up {Instructions Charge Capture  Follow-up Communications :23}     Return in about 4 weeks (around 4/21/2025) for Chest pain, Hypertension, Result review.    Patient was given instructions and counseling regarding his condition or for health maintenance advice. Please see specific information pulled into the AVS if appropriate. Patient was instructed to call the [facility] with any questions, concerns, or worsening symptoms.    Dictated Utilizing Dragon Dictation   Please note that portions of this note were completed with a voice recognition program. Part of this note may be an electronic transcription/translation of spoken language to printed text using the Dragon  Dictation System.

## 2025-03-24 NOTE — PATIENT INSTRUCTIONS
Please check blood pressure once per day using an upper arm blood pressure cuff  Do so 2 - 3 hours after taking morning medications, and after sitting and resting for 10 to 15 minutes  Write down date, time, blood pressure, and pulse rate  Goal blood pressure is consistently less than 140/90  Please limit sodium intake to 1500 mg daily  Recommend participation in the DASH diet

## 2025-03-25 LAB
QT INTERVAL: 434 MS
QTC INTERVAL: 429 MS

## 2025-03-26 ENCOUNTER — TELEPHONE (OUTPATIENT)
Dept: CARDIOLOGY | Facility: HOSPITAL | Age: 70
End: 2025-03-26
Payer: MEDICARE

## 2025-03-26 NOTE — TELEPHONE ENCOUNTER
Pt's wife called with questions regarding testing - RN reviewed testing schedule and pt verbalized understanding

## 2025-03-31 ENCOUNTER — HOSPITAL ENCOUNTER (OUTPATIENT)
Facility: HOSPITAL | Age: 70
Discharge: HOME | End: 2025-03-31
Payer: MEDICARE

## 2025-03-31 VITALS
BODY MASS INDEX: 35.24 KG/M2 | HEIGHT: 72 IN | DIASTOLIC BLOOD PRESSURE: 86 MMHG | WEIGHT: 260.14 LBS | SYSTOLIC BLOOD PRESSURE: 155 MMHG

## 2025-03-31 DIAGNOSIS — R07.89 OTHER CHEST PAIN: ICD-10-CM

## 2025-03-31 DIAGNOSIS — R06.09 DYSPNEA ON EXERTION: ICD-10-CM

## 2025-03-31 DIAGNOSIS — I10 PRIMARY HYPERTENSION: ICD-10-CM

## 2025-03-31 LAB
AORTIC ARCH: 2.5 CM
AORTIC DIMENSIONLESS INDEX: 0.85 (DI)
AV MEAN PRESS GRAD SYS DOP V1V2: 3 MMHG
AV VMAX SYS DOP: 100 CM/SEC
BH CV ECHO MEAS - AO MAX PG: 4 MMHG
BH CV ECHO MEAS - AO ROOT DIAM: 3.2 CM
BH CV ECHO MEAS - AO V2 VTI: 29.1 CM
BH CV ECHO MEAS - AVA(I,D): 2.9 CM2
BH CV ECHO MEAS - EDV(CUBED): 100.5 ML
BH CV ECHO MEAS - EDV(MOD-SP2): 156 ML
BH CV ECHO MEAS - EDV(MOD-SP4): 135 ML
BH CV ECHO MEAS - EF(MOD-SP2): 67.4 %
BH CV ECHO MEAS - EF(MOD-SP4): 71.3 %
BH CV ECHO MEAS - ESV(CUBED): 32.8 ML
BH CV ECHO MEAS - ESV(MOD-SP2): 50.9 ML
BH CV ECHO MEAS - ESV(MOD-SP4): 38.7 ML
BH CV ECHO MEAS - FS: 31.2 %
BH CV ECHO MEAS - IVS/LVPW: 1.05 CM
BH CV ECHO MEAS - IVSD: 1 CM
BH CV ECHO MEAS - LA DIMENSION: 3.2 CM
BH CV ECHO MEAS - LAT PEAK E' VEL: 7.9 CM/SEC
BH CV ECHO MEAS - LV MASS(C)D: 156.1 GRAMS
BH CV ECHO MEAS - LV MAX PG: 3 MMHG
BH CV ECHO MEAS - LV MEAN PG: 2 MMHG
BH CV ECHO MEAS - LV V1 MAX: 86.2 CM/SEC
BH CV ECHO MEAS - LV V1 VTI: 24.6 CM
BH CV ECHO MEAS - LVIDD: 4.7 CM
BH CV ECHO MEAS - LVIDS: 3.2 CM
BH CV ECHO MEAS - LVOT AREA: 3.5 CM2
BH CV ECHO MEAS - LVOT DIAM: 2.1 CM
BH CV ECHO MEAS - LVPWD: 0.95 CM
BH CV ECHO MEAS - MED PEAK E' VEL: 7.6 CM/SEC
BH CV ECHO MEAS - MV A MAX VEL: 94.8 CM/SEC
BH CV ECHO MEAS - MV DEC SLOPE: 335 CM/SEC2
BH CV ECHO MEAS - MV DEC TIME: 0.23 SEC
BH CV ECHO MEAS - MV E MAX VEL: 75.4 CM/SEC
BH CV ECHO MEAS - MV E/A: 0.8
BH CV ECHO MEAS - MV MAX PG: 3.7 MMHG
BH CV ECHO MEAS - MV MEAN PG: 1 MMHG
BH CV ECHO MEAS - MV V2 VTI: 35.9 CM
BH CV ECHO MEAS - MVA(VTI): 2.37 CM2
BH CV ECHO MEAS - PA ACC TIME: 0.17 SEC
BH CV ECHO MEAS - PA V2 MAX: 94.6 CM/SEC
BH CV ECHO MEAS - SV(LVOT): 85.2 ML
BH CV ECHO MEAS - SV(MOD-SP2): 105.1 ML
BH CV ECHO MEAS - SV(MOD-SP4): 96.3 ML
BH CV ECHO MEAS - TAPSE (>1.6): 3.2 CM
BH CV ECHO MEASUREMENTS AVERAGE E/E' RATIO: 9.73
BH CV REST NUCLEAR ISOTOPE DOSE: 9.6 MCI
BH CV STRESS BP STAGE 2: NORMAL
BH CV STRESS BP STAGE 4: NORMAL
BH CV STRESS COMMENTS STAGE 1: NORMAL
BH CV STRESS DOSE REGADENOSON STAGE 1: 0.4
BH CV STRESS DURATION MIN STAGE 1: 1
BH CV STRESS DURATION MIN STAGE 2: 1
BH CV STRESS DURATION MIN STAGE 3: 1
BH CV STRESS DURATION MIN STAGE 4: 1
BH CV STRESS DURATION SEC STAGE 1: 10
BH CV STRESS DURATION SEC STAGE 2: 0
BH CV STRESS DURATION SEC STAGE 3: 0
BH CV STRESS DURATION SEC STAGE 4: 0
BH CV STRESS HR STAGE 1: 60
BH CV STRESS HR STAGE 2: 67
BH CV STRESS HR STAGE 3: 63
BH CV STRESS HR STAGE 4: 61
BH CV STRESS NUCLEAR ISOTOPE DOSE: 31.8 MCI
BH CV STRESS O2 STAGE 1: 97
BH CV STRESS O2 STAGE 2: 95
BH CV STRESS O2 STAGE 3: 97
BH CV STRESS O2 STAGE 4: 97
BH CV STRESS PROTOCOL 1: NORMAL
BH CV STRESS RECOVERY BP: NORMAL MMHG
BH CV STRESS RECOVERY HR: 62 BPM
BH CV STRESS RECOVERY O2: 96 %
BH CV STRESS STAGE 1: 1
BH CV STRESS STAGE 2: 2
BH CV STRESS STAGE 3: 3
BH CV STRESS STAGE 4: 4
BH CV XLRA - RV BASE: 3 CM
BH CV XLRA - RV LENGTH: 7 CM
BH CV XLRA - RV MID: 2.1 CM
BH CV XLRA - TDI S': 10.8 CM/SEC
IVRT: 46 MS
LEFT ATRIUM VOLUME INDEX: 24.6 ML/M2
LV EF BIPLANE MOD: 69.2 %
MAXIMAL PREDICTED HEART RATE: 151 BPM
PERCENT MAX PREDICTED HR: 44.37 %
SPECT HRT GATED+EF W RNC IV: 64 %
STRESS BASELINE BP: NORMAL MMHG
STRESS BASELINE HR: 59 BPM
STRESS O2 SAT REST: 95 %
STRESS PERCENT HR: 52 %
STRESS POST ESTIMATED WORKLOAD: 1 METS
STRESS POST EXERCISE DUR MIN: 4 MIN
STRESS POST EXERCISE DUR SEC: 0 SEC
STRESS POST O2 SAT PEAK: 97 %
STRESS POST PEAK BP: NORMAL MMHG
STRESS POST PEAK HR: 67 BPM
STRESS TARGET HR: 128 BPM

## 2025-03-31 PROCEDURE — 78452 HT MUSCLE IMAGE SPECT MULT: CPT | Performed by: INTERNAL MEDICINE

## 2025-03-31 PROCEDURE — 25010000002 SULFUR HEXAFLUORIDE MICROSPH 60.7-25 MG RECONSTITUTED SUSPENSION: Performed by: NURSE PRACTITIONER

## 2025-03-31 PROCEDURE — 78452 HT MUSCLE IMAGE SPECT MULT: CPT

## 2025-03-31 PROCEDURE — 93017 CV STRESS TEST TRACING ONLY: CPT

## 2025-03-31 PROCEDURE — 93306 TTE W/DOPPLER COMPLETE: CPT

## 2025-03-31 PROCEDURE — 25010000002 REGADENOSON 0.4 MG/5ML SOLUTION: Performed by: NURSE PRACTITIONER

## 2025-03-31 PROCEDURE — 93018 CV STRESS TEST I&R ONLY: CPT | Performed by: INTERNAL MEDICINE

## 2025-03-31 PROCEDURE — 34310000005 TECHNETIUM SESTAMIBI: Performed by: NURSE PRACTITIONER

## 2025-03-31 PROCEDURE — A9500 TC99M SESTAMIBI: HCPCS | Performed by: NURSE PRACTITIONER

## 2025-03-31 PROCEDURE — 93306 TTE W/DOPPLER COMPLETE: CPT | Performed by: INTERNAL MEDICINE

## 2025-03-31 PROCEDURE — 93016 CV STRESS TEST SUPVJ ONLY: CPT | Performed by: INTERNAL MEDICINE

## 2025-03-31 RX ORDER — REGADENOSON 0.08 MG/ML
0.4 INJECTION, SOLUTION INTRAVENOUS ONCE
Status: COMPLETED | OUTPATIENT
Start: 2025-03-31 | End: 2025-03-31

## 2025-03-31 RX ADMIN — SULFUR HEXAFLUORIDE 3 ML: KIT at 10:40

## 2025-03-31 RX ADMIN — TECHNETIUM TC 99M SESTAMIBI 1 DOSE: 1 INJECTION INTRAVENOUS at 13:45

## 2025-03-31 RX ADMIN — REGADENOSON 0.4 MG: 0.08 INJECTION INTRAVENOUS at 13:41

## 2025-03-31 RX ADMIN — TECHNETIUM TC 99M SESTAMIBI 1 DOSE: 1 INJECTION INTRAVENOUS at 12:00

## 2025-04-01 ENCOUNTER — RESULTS FOLLOW-UP (OUTPATIENT)
Dept: CARDIOLOGY | Facility: HOSPITAL | Age: 70
End: 2025-04-01
Payer: MEDICARE

## 2025-04-01 DIAGNOSIS — R07.89 OTHER CHEST PAIN: Primary | ICD-10-CM

## 2025-04-01 DIAGNOSIS — R94.39 ABNORMAL NUCLEAR STRESS TEST: ICD-10-CM

## 2025-04-01 NOTE — PROGRESS NOTES
Your echocardiogram is within normal limits.   Your heart squeezes normally.  There are no significant valvular abnormalities noted.  Please keep your follow-up with TOMASZ Esparza. (Message released to [employer])

## 2025-04-01 NOTE — TELEPHONE ENCOUNTER
Contacted patient regarding recent testing ordered by TOMASZ Esparza.  Echo overall showed no significant abnormalities, however, nuclear stress test did show, small area of photopenia in the inferior wall and septal wall with no significant ischemia.  He reports that during the second set of pictures he realized that he was wearing a pin on his shirt to the right side of his chest and wonders if this caused artifact.  Recommend that he follow-up with cardiology due to intermediate study as well as ongoing chest pain.  He reports chest pain currently is stable and not exertional, but still occurring randomly.  He agrees to follow-up with cardiology and had no further questions or concerns

## 2025-04-23 ENCOUNTER — OFFICE VISIT (OUTPATIENT)
Dept: CARDIOLOGY | Facility: CLINIC | Age: 70
End: 2025-04-23
Payer: MEDICARE

## 2025-04-23 VITALS
OXYGEN SATURATION: 94 % | HEIGHT: 72 IN | WEIGHT: 257 LBS | DIASTOLIC BLOOD PRESSURE: 64 MMHG | SYSTOLIC BLOOD PRESSURE: 110 MMHG | HEART RATE: 65 BPM | BODY MASS INDEX: 34.81 KG/M2

## 2025-04-23 DIAGNOSIS — I73.9 CLAUDICATION OF CALF MUSCLES: ICD-10-CM

## 2025-04-23 DIAGNOSIS — I25.118 CORONARY ARTERY DISEASE OF NATIVE ARTERY OF NATIVE HEART WITH STABLE ANGINA PECTORIS: ICD-10-CM

## 2025-04-23 DIAGNOSIS — R94.39 ABNORMAL STRESS TEST: ICD-10-CM

## 2025-04-23 DIAGNOSIS — I10 PRIMARY HYPERTENSION: ICD-10-CM

## 2025-04-23 DIAGNOSIS — M79.604 PAIN IN BOTH LOWER EXTREMITIES: Primary | ICD-10-CM

## 2025-04-23 DIAGNOSIS — R06.09 DYSPNEA ON EXERTION: ICD-10-CM

## 2025-04-23 DIAGNOSIS — M79.605 PAIN IN BOTH LOWER EXTREMITIES: Primary | ICD-10-CM

## 2025-04-23 DIAGNOSIS — E78.1 PURE HYPERTRIGLYCERIDEMIA: ICD-10-CM

## 2025-04-23 DIAGNOSIS — R94.39 ABNORMAL STRESS TEST: Primary | ICD-10-CM

## 2025-04-23 DIAGNOSIS — R07.89 OTHER CHEST PAIN: ICD-10-CM

## 2025-04-23 RX ORDER — DIPHENHYDRAMINE HCL 25 MG
50 TABLET ORAL
OUTPATIENT
Start: 2025-04-23 | End: 2025-04-23

## 2025-04-23 RX ORDER — DOCUSATE SODIUM 100 MG/1
100 CAPSULE, LIQUID FILLED ORAL AS NEEDED
COMMUNITY

## 2025-04-23 RX ORDER — DIPHENHYDRAMINE HYDROCHLORIDE 50 MG/ML
50 INJECTION, SOLUTION INTRAMUSCULAR; INTRAVENOUS
OUTPATIENT
Start: 2025-04-23 | End: 2025-04-24

## 2025-04-23 RX ORDER — PREDNISONE 50 MG/1
50 TABLET ORAL TAKE AS DIRECTED
Qty: 3 TABLET | Refills: 0 | Status: SHIPPED | OUTPATIENT
Start: 2025-04-23

## 2025-04-23 RX ORDER — POTASSIUM CHLORIDE 750 MG/1
10 TABLET, EXTENDED RELEASE ORAL
COMMUNITY

## 2025-04-23 RX ORDER — ACETAMINOPHEN 500 MG
1000 TABLET ORAL EVERY MORNING
COMMUNITY

## 2025-04-23 RX ORDER — CHOLECALCIFEROL (VITAMIN D3) 25 MCG
1000 TABLET ORAL
COMMUNITY

## 2025-04-23 NOTE — H&P (VIEW-ONLY)
"[facility] CARDIOLOGY    New Patient Office Visit    Patient Name: Bhaskar Torres  : 1955   MRN: [id number]   Care Team: Patient Care Team:  Ramesh Trivedi MD as PCP - General (General Practice)  Rafa Tam MD as Consulting Physician (Hematology and Oncology)    Chief Complaint   Patient presents with    Other chest pain     Abnormal Stress Test    Establish Care     Chest pain   - possible \"silent\" heart attack in the past   3/17/09 - [place]  reportedly normal  Negative stress testing in [place]  3/31/25 - SPECT Stress - EF 64%, inferior / septal photopenia, SSS 5, TID 1.3  Hypertension  DM II  Leg pain on exertion   HLD       Subjective   HPI: Bhaskar Torres is a 69 y.o. male with a history of HLD, DM, HTN who presents today to establish care with a cardiologist after ER visit in March for chest pain and follow-up testing done through the heart and valve clinic.  Reports that around February or March he began having episodes of chest pain in his upper left chest with radiation to his shoulder.  He had had lower pain in his left side at times as well however this was brief and more concerning for reflux.  He also has been having some right sided chest pain.  His symptoms are not necessarily exertional but does report significant dyspnea on exertion as well as bilateral leg pain with exertion.    Review of Systems   Constitutional:  Positive for activity change and fatigue.   Eyes:  Positive for visual disturbance.   Respiratory:  Positive for shortness of breath.    Cardiovascular:  Positive for chest pain.        Bilateral leg pain on exertion    Gastrointestinal:         Dysphagia   Genitourinary:  Positive for urgency.   Musculoskeletal:  Positive for arthralgias and myalgias.   Allergic/Immunologic: Positive for environmental allergies.   Neurological:  Positive for memory problem.       Past Medical History:   Diagnosis Date    Arthritis     Asthma     " Bronchitis     Diabetes mellitus     Gout     Hyperlipidemia     Hypertension     Kidney stones     Prostate cancer      Past Surgical History:   Procedure Laterality Date    ADENOIDECTOMY      ENDOSCOPY N/A 10/14/2023    Procedure: ESOPHAGOGASTRODUODENOSCOPY;  Surgeon: Samy Real MD;  Location: [facility] ENDOSCOPY;  Service: Gastroenterology;  Laterality: N/A;    HERNIA REPAIR      PROSTATECTOMY      THYROID SURGERY Left     TONSILLECTOMY       Social History     Socioeconomic History    Marital status:    Tobacco Use    Smoking status: Never     Passive exposure: Never    Smokeless tobacco: Never   Vaping Use    Vaping status: Never Used   Substance and Sexual Activity    Alcohol use: Yes     Alcohol/week: 21.0 standard drinks of alcohol     Types: 21 Shots of liquor per week    Drug use: Never    Sexual activity: Defer     Family History   Problem Relation Age of Onset    No Known Problems Mother     Heart disease Father     Heart attack Paternal Uncle     Stroke Paternal Uncle     Pancreatic cancer Maternal Grandfather 72    Heart attack Paternal Grandmother     Heart attack Paternal Grandfather          Current Outpatient Medications:     acetaminophen (TYLENOL) 500 MG tablet, Take 2 tablets by mouth Every Morning., Disp: , Rfl:     allopurinol (ZYLOPRIM) 100 MG tablet, Take 1 tablet by mouth Daily., Disp: , Rfl:     amLODIPine (NORVASC) 5 MG tablet, Take 1 tablet by mouth Every Night., Disp: , Rfl:     aspirin 81 MG EC tablet, Take 1 tablet by mouth 2 (Two) Times a Week. PRN, Disp: , Rfl:     atorvastatin (LIPITOR) 80 MG tablet, Take 1 tablet by mouth Daily., Disp: , Rfl:     Cholecalciferol 25 MCG (1000 UT) tablet, Take 1 tablet by mouth Every 30 (Thirty) Days., Disp: , Rfl:     docusate sodium (COLACE) 100 MG capsule, Take 1 capsule by mouth As Needed for Constipation., Disp: , Rfl:     Insulin Glargine (LANTUS SOLOSTAR) 100 UNIT/ML injection pen, Inject 30 Units under the skin into the appropriate  "area as directed Every Night., Disp: , Rfl:     Insulin Lispro, 1 Unit Dial, (HUMALOG) 100 UNIT/ML solution pen-injector, Inject 10 Units under the skin into the appropriate area as directed., Disp: , Rfl:     loratadine (CLARITIN) 10 MG tablet, Take 1 tablet by mouth Daily As Needed., Disp: , Rfl:     metoprolol succinate XL (TOPROL-XL) 100 MG 24 hr tablet, Take 1 tablet by mouth Daily., Disp: , Rfl:     Multiple Vitamins-Minerals (AIRBORNE PO), Take  by mouth As Needed., Disp: , Rfl:     Pediatric Multiple Vitamins (FLINTSTONES MULTIVITAMIN PO), Take  by mouth Daily., Disp: , Rfl:     potassium chloride 10 MEQ CR tablet, Take 1 tablet by mouth Every 30 (Thirty) Days., Disp: , Rfl:     tacrolimus (PROTOPIC) 0.1 % ointment, APPLY A THIN LAYER TO THE AFFECTED AREA(S) BY TOPICAL ROUTE 2 TIMES PER DAY ; RUB IN GENTLY AND COMPLETELY, Disp: , Rfl:     predniSONE (DELTASONE) 50 MG tablet, Take 1 tablet by mouth Take As Directed for 3 doses. Take 1 tablet (50mg) 13 Hours 7 Hours & 1 Hour Prior to Exam, Disp: 3 tablet, Rfl: 0    Allergies   Allergen Reactions    Amoxicillin Other (See Comments)     Causes hair to fall out    Glipizide Other (See Comments)     Weight gain    Ct Contrast Other (See Comments)     Body aches    Metformin Other (See Comments)     constipation        Objective     Vitals:    04/23/25 1013   BP: 110/64   BP Location: Left arm   Patient Position: Sitting   Cuff Size: Adult   Pulse: 65   SpO2: 94%   Weight: 117 kg (257 lb)   Height: 182.9 cm (72\")   Body mass index is 34.86 kg/m².  Gen: well developed, sitting up on exam table, comfortable appearing  HEENT: MMM, sclera anicteric, conjunctiva normal, no carotid bruits  CV: regular rate, regular rhythm, no murmurs or rubs, normal S1, S2. 2+ radial and PT pulses  Pulm: RA, normal work of breathing, no wheezes, rales, rhonchi  Abd: soft, non-tender, non-distended  Ext: normal bulk for age, normal tone, no dependent edema  Neuro: alert, oriented, face " symmetrical, moving all extremities well  Psych: normal mood, appropriate affect    Most recent PCP note, imaging tests, and labs reviewed.    Labs:  Lab Results   Component Value Date    WBC 5.75 03/13/2025    HGB 14.6 03/13/2025    HCT 43.6 03/13/2025    MCV 91.2 03/13/2025     03/13/2025     Lab Results   Component Value Date    GLUCOSE 263 (H) 03/13/2025    BUN 20 03/13/2025    CREATININE 0.94 03/13/2025    EGFRIFNONA 68 10/12/2021    EGFRIFAFRI 83 10/12/2021    BCR 21.3 03/13/2025    K 4.5 03/13/2025    CO2 26.0 03/13/2025    CALCIUM 9.5 03/13/2025    ALBUMIN 4.2 03/13/2025    AST 23 03/13/2025    ALT 22 03/13/2025     Lab Results   Component Value Date    HGBA1C 7.90 (H) 03/13/2025     Lab Results   Component Value Date    CHOL 147 10/14/2023    TRIG 167 (H) 10/14/2023    HDL 36 (L) 10/14/2023    LDL 82 10/14/2023     Results for orders placed during the hospital encounter of 03/31/25    Adult Transthoracic Echo Complete W/ Cont if Necessary Per Protocol    Interpretation Summary    Left ventricular systolic function is normal. Calculated left ventricular EF = 69.2% Left ventricular ejection fraction appears to be 66 - 70%.    Left ventricular diastolic function is consistent with (grade I) impaired relaxation.    The aortic valve exhibits sclerosis    Aortic valve maximum pressure gradient is 4 mmHg.    3/31/25 - Stress MPI    Regadenoson stress test with myocardial perfusion SPECT performed without complications.    Patient denied chest pain/discomfort during stress. Pt did complain of SOA (SP02 WNL on RA) that resolved in recovery.    No ST-T wave changes noted with vasodilator administration.    GI artifact is present.    Left ventricular ejection fraction is normal (Calculated EF = 64%).    Myocardial perfusion imaging indicates a small-sized area of photopenia located in the inferior wall and septal wall with no significant ischemia noted.  This is consistent with prior inferior MI versus GI  uptake artifact as there is no significant difference with stress. SSS 5, SRS 6, SDS 1. TID ratio 1.3.    Impressions are consistent with an intermediate risk study.    Procedures    Advance Care Planning   ACP discussion was declined by the patient. Patient has an advance directive in EMR which is still valid.           Assessment & Plan       ICD-10-CM ICD-9-CM   1. Pain in both lower extremities  M79.604 729.5    M79.605    2. Claudication of calf muscles  I73.9 443.9   3. Coronary artery disease of native artery of native heart with stable angina pectoris  I25.118 414.01     413.9   4. Abnormal stress test  R94.39 794.39   5. Primary hypertension  I10 401.9   6. Pure hypertriglyceridemia  E78.1 272.1        Chest pain  Abnormal stress   - Multiple Rfs for CAD   - Inf Q waves on ECG correlate with abnormal stress findings   - CT chest with CAC in LCX and RCA   - will refer for C with pre-medication with history of nausea / body aches with CT contrast   - start aspirin, high intensity statin    Leg pain on exertion   - normal vascular exam at rest, will refer for exercise YULY   - RF for PAD but also may have peripheral neuropathy due to uncontrolled DM or neurogenic claudications    HTN   - controlled on home log, no medication changes    HLD   - statin as above    Return in about 4 months (around 8/23/2025).    JODI Garcia MD  04/23/2025     [facility] Cardiology  [address] [address] [phone]

## 2025-04-23 NOTE — PROGRESS NOTES
"[facility] CARDIOLOGY    New Patient Office Visit    Patient Name: Bhaskar Torres  : 1955   MRN: [id number]   Care Team: Patient Care Team:  Ramesh Trivedi MD as PCP - General (General Practice)  Rafa Tam MD as Consulting Physician (Hematology and Oncology)    Chief Complaint   Patient presents with    Other chest pain     Abnormal Stress Test    Establish Care     Chest pain   - possible \"silent\" heart attack in the past   3/17/09 - [place], [place]  reportedly normal  Negative stress testing in [place]  3/31/25 - SPECT Stress - EF 64%, inferior / septal photopenia, SSS 5, TID 1.3  Hypertension  DM II  Leg pain on exertion   HLD       Subjective   HPI: Bhaskar Torres is a 69 y.o. male with a history of HLD, DM, HTN who presents today to establish care with a cardiologist after ER visit in March for chest pain and follow-up testing done through the heart and valve clinic.  Reports that around February or March he began having episodes of chest pain in his upper left chest with radiation to his shoulder.  He had had lower pain in his left side at times as well however this was brief and more concerning for reflux.  He also has been having some right sided chest pain.  His symptoms are not necessarily exertional but does report significant dyspnea on exertion as well as bilateral leg pain with exertion.    Review of Systems   Constitutional:  Positive for activity change and fatigue.   Eyes:  Positive for visual disturbance.   Respiratory:  Positive for shortness of breath.    Cardiovascular:  Positive for chest pain.        Bilateral leg pain on exertion    Gastrointestinal:         Dysphagia   Genitourinary:  Positive for urgency.   Musculoskeletal:  Positive for arthralgias and myalgias.   Allergic/Immunologic: Positive for environmental allergies.   Neurological:  Positive for memory problem.       Past Medical History:   Diagnosis Date    Arthritis     Asthma     " Bronchitis     Diabetes mellitus     Gout     Hyperlipidemia     Hypertension     Kidney stones     Prostate cancer      Past Surgical History:   Procedure Laterality Date    ADENOIDECTOMY      ENDOSCOPY N/A 10/14/2023    Procedure: ESOPHAGOGASTRODUODENOSCOPY;  Surgeon: Samy Real MD;  Location: [facility] ENDOSCOPY;  Service: Gastroenterology;  Laterality: N/A;    HERNIA REPAIR      PROSTATECTOMY      THYROID SURGERY Left     TONSILLECTOMY       Social History     Socioeconomic History    Marital status:    Tobacco Use    Smoking status: Never     Passive exposure: Never    Smokeless tobacco: Never   Vaping Use    Vaping status: Never Used   Substance and Sexual Activity    Alcohol use: Yes     Alcohol/week: 21.0 standard drinks of alcohol     Types: 21 Shots of liquor per week    Drug use: Never    Sexual activity: Defer     Family History   Problem Relation Age of Onset    No Known Problems Mother     Heart disease Father     Heart attack Paternal Uncle     Stroke Paternal Uncle     Pancreatic cancer Maternal Grandfather 72    Heart attack Paternal Grandmother     Heart attack Paternal Grandfather          Current Outpatient Medications:     acetaminophen (TYLENOL) 500 MG tablet, Take 2 tablets by mouth Every Morning., Disp: , Rfl:     allopurinol (ZYLOPRIM) 100 MG tablet, Take 1 tablet by mouth Daily., Disp: , Rfl:     amLODIPine (NORVASC) 5 MG tablet, Take 1 tablet by mouth Every Night., Disp: , Rfl:     aspirin 81 MG EC tablet, Take 1 tablet by mouth 2 (Two) Times a Week. PRN, Disp: , Rfl:     atorvastatin (LIPITOR) 80 MG tablet, Take 1 tablet by mouth Daily., Disp: , Rfl:     Cholecalciferol 25 MCG (1000 UT) tablet, Take 1 tablet by mouth Every 30 (Thirty) Days., Disp: , Rfl:     docusate sodium (COLACE) 100 MG capsule, Take 1 capsule by mouth As Needed for Constipation., Disp: , Rfl:     Insulin Glargine (LANTUS SOLOSTAR) 100 UNIT/ML injection pen, Inject 30 Units under the skin into the appropriate  "area as directed Every Night., Disp: , Rfl:     Insulin Lispro, 1 Unit Dial, (HUMALOG) 100 UNIT/ML solution pen-injector, Inject 10 Units under the skin into the appropriate area as directed., Disp: , Rfl:     loratadine (CLARITIN) 10 MG tablet, Take 1 tablet by mouth Daily As Needed., Disp: , Rfl:     metoprolol succinate XL (TOPROL-XL) 100 MG 24 hr tablet, Take 1 tablet by mouth Daily., Disp: , Rfl:     Multiple Vitamins-Minerals (AIRBORNE PO), Take  by mouth As Needed., Disp: , Rfl:     Pediatric Multiple Vitamins (FLINTSTONES MULTIVITAMIN PO), Take  by mouth Daily., Disp: , Rfl:     potassium chloride 10 MEQ CR tablet, Take 1 tablet by mouth Every 30 (Thirty) Days., Disp: , Rfl:     tacrolimus (PROTOPIC) 0.1 % ointment, APPLY A THIN LAYER TO THE AFFECTED AREA(S) BY TOPICAL ROUTE 2 TIMES PER DAY ; RUB IN GENTLY AND COMPLETELY, Disp: , Rfl:     predniSONE (DELTASONE) 50 MG tablet, Take 1 tablet by mouth Take As Directed for 3 doses. Take 1 tablet (50mg) 13 Hours 7 Hours & 1 Hour Prior to Exam, Disp: 3 tablet, Rfl: 0    Allergies   Allergen Reactions    Amoxicillin Other (See Comments)     Causes hair to fall out    Glipizide Other (See Comments)     Weight gain    Ct Contrast Other (See Comments)     Body aches    Metformin Other (See Comments)     constipation        Objective     Vitals:    04/23/25 1013   BP: 110/64   BP Location: Left arm   Patient Position: Sitting   Cuff Size: Adult   Pulse: 65   SpO2: 94%   Weight: 117 kg (257 lb)   Height: 182.9 cm (72\")   Body mass index is 34.86 kg/m².  Gen: well developed, sitting up on exam table, comfortable appearing  HEENT: MMM, sclera anicteric, conjunctiva normal, no carotid bruits  CV: regular rate, regular rhythm, no murmurs or rubs, normal S1, S2. 2+ radial and PT pulses  Pulm: RA, normal work of breathing, no wheezes, rales, rhonchi  Abd: soft, non-tender, non-distended  Ext: normal bulk for age, normal tone, no dependent edema  Neuro: alert, oriented, face " symmetrical, moving all extremities well  Psych: normal mood, appropriate affect    Most recent PCP note, imaging tests, and labs reviewed.    Labs:  Lab Results   Component Value Date    WBC 5.75 03/13/2025    HGB 14.6 03/13/2025    HCT 43.6 03/13/2025    MCV 91.2 03/13/2025     03/13/2025     Lab Results   Component Value Date    GLUCOSE 263 (H) 03/13/2025    BUN 20 03/13/2025    CREATININE 0.94 03/13/2025    EGFRIFNONA 68 10/12/2021    EGFRIFAFRI 83 10/12/2021    BCR 21.3 03/13/2025    K 4.5 03/13/2025    CO2 26.0 03/13/2025    CALCIUM 9.5 03/13/2025    ALBUMIN 4.2 03/13/2025    AST 23 03/13/2025    ALT 22 03/13/2025     Lab Results   Component Value Date    HGBA1C 7.90 (H) 03/13/2025     Lab Results   Component Value Date    CHOL 147 10/14/2023    TRIG 167 (H) 10/14/2023    HDL 36 (L) 10/14/2023    LDL 82 10/14/2023     Results for orders placed during the hospital encounter of 03/31/25    Adult Transthoracic Echo Complete W/ Cont if Necessary Per Protocol    Interpretation Summary    Left ventricular systolic function is normal. Calculated left ventricular EF = 69.2% Left ventricular ejection fraction appears to be 66 - 70%.    Left ventricular diastolic function is consistent with (grade I) impaired relaxation.    The aortic valve exhibits sclerosis    Aortic valve maximum pressure gradient is 4 mmHg.    3/31/25 - Stress MPI    Regadenoson stress test with myocardial perfusion SPECT performed without complications.    Patient denied chest pain/discomfort during stress. Pt did complain of SOA (SP02 WNL on RA) that resolved in recovery.    No ST-T wave changes noted with vasodilator administration.    GI artifact is present.    Left ventricular ejection fraction is normal (Calculated EF = 64%).    Myocardial perfusion imaging indicates a small-sized area of photopenia located in the inferior wall and septal wall with no significant ischemia noted.  This is consistent with prior inferior MI versus GI  uptake artifact as there is no significant difference with stress. SSS 5, SRS 6, SDS 1. TID ratio 1.3.    Impressions are consistent with an intermediate risk study.    Procedures    Advance Care Planning   ACP discussion was declined by the patient. Patient has an advance directive in EMR which is still valid.           Assessment & Plan       ICD-10-CM ICD-9-CM   1. Pain in both lower extremities  M79.604 729.5    M79.605    2. Claudication of calf muscles  I73.9 443.9   3. Coronary artery disease of native artery of native heart with stable angina pectoris  I25.118 414.01     413.9   4. Abnormal stress test  R94.39 794.39   5. Primary hypertension  I10 401.9   6. Pure hypertriglyceridemia  E78.1 272.1        Chest pain  Abnormal stress   - Multiple Rfs for CAD   - Inf Q waves on ECG correlate with abnormal stress findings   - CT chest with CAC in LCX and RCA   - will refer for C with pre-medication with history of nausea / body aches with CT contrast   - start aspirin, high intensity statin    Leg pain on exertion   - normal vascular exam at rest, will refer for exercise YULY   - RF for PAD but also may have peripheral neuropathy due to uncontrolled DM or neurogenic claudications    HTN   - controlled on home log, no medication changes    HLD   - statin as above    Return in about 4 months (around 8/23/2025).    JODI Garcia MD  04/23/2025     [facility] Cardiology  [address] [address] [phone]

## 2025-04-29 ENCOUNTER — HOSPITAL ENCOUNTER (OUTPATIENT)
Facility: HOSPITAL | Age: 70
Setting detail: HOSPITAL OUTPATIENT SURGERY
Discharge: HOME | End: 2025-04-29
Attending: INTERNAL MEDICINE | Admitting: INTERNAL MEDICINE
Payer: MEDICARE

## 2025-04-29 VITALS
WEIGHT: 251.6 LBS | OXYGEN SATURATION: 93 % | SYSTOLIC BLOOD PRESSURE: 137 MMHG | RESPIRATION RATE: 14 BRPM | BODY MASS INDEX: 34.08 KG/M2 | HEART RATE: 61 BPM | TEMPERATURE: 97.6 F | HEIGHT: 72 IN | DIASTOLIC BLOOD PRESSURE: 89 MMHG

## 2025-04-29 DIAGNOSIS — I25.118 CORONARY ARTERY DISEASE OF NATIVE ARTERY OF NATIVE HEART WITH STABLE ANGINA PECTORIS: ICD-10-CM

## 2025-04-29 DIAGNOSIS — R06.09 DYSPNEA ON EXERTION: ICD-10-CM

## 2025-04-29 DIAGNOSIS — R07.89 OTHER CHEST PAIN: ICD-10-CM

## 2025-04-29 DIAGNOSIS — R94.39 ABNORMAL STRESS TEST: ICD-10-CM

## 2025-04-29 LAB
ACT BLD: 349 SECONDS (ref 82–152)
ALBUMIN SERPL-MCNC: 4.7 G/DL (ref 3.5–5.2)
ALBUMIN/GLOB SERPL: 2.4 G/DL
ALP SERPL-CCNC: 126 U/L (ref 39–117)
ALT SERPL W P-5'-P-CCNC: 25 U/L (ref 1–41)
ANION GAP SERPL CALCULATED.3IONS-SCNC: 15 MMOL/L (ref 5–15)
AST SERPL-CCNC: 23 U/L (ref 1–40)
BILIRUB SERPL-MCNC: 0.7 MG/DL (ref 0–1.2)
BUN SERPL-MCNC: 27 MG/DL (ref 8–23)
BUN/CREAT SERPL: 21.4 (ref 7–25)
CALCIUM SPEC-SCNC: 9.7 MG/DL (ref 8.6–10.5)
CHLORIDE SERPL-SCNC: 101 MMOL/L (ref 98–107)
CHOLEST SERPL-MCNC: 170 MG/DL (ref 0–200)
CO2 SERPL-SCNC: 22 MMOL/L (ref 22–29)
CREAT BLDA-MCNC: 1.3 MG/DL (ref 0.6–1.3)
CREAT SERPL-MCNC: 1.26 MG/DL (ref 0.76–1.27)
DEPRECATED RDW RBC AUTO: 41.6 FL (ref 37–54)
EGFRCR SERPLBLD CKD-EPI 2021: 61.7 ML/MIN/1.73
ERYTHROCYTE [DISTWIDTH] IN BLOOD BY AUTOMATED COUNT: 12.8 % (ref 12.3–15.4)
GLOBULIN UR ELPH-MCNC: 2 GM/DL
GLUCOSE SERPL-MCNC: 341 MG/DL (ref 65–99)
HCT VFR BLD AUTO: 46.7 % (ref 37.5–51)
HDLC SERPL-MCNC: 49 MG/DL (ref 40–60)
HGB BLD-MCNC: 15.9 G/DL (ref 13–17.7)
LDLC SERPL CALC-MCNC: 110 MG/DL (ref 0–100)
LDLC/HDLC SERPL: 2.24 {RATIO}
MCH RBC QN AUTO: 30.3 PG (ref 26.6–33)
MCHC RBC AUTO-ENTMCNC: 34 G/DL (ref 31.5–35.7)
MCV RBC AUTO: 89 FL (ref 79–97)
PLATELET # BLD AUTO: 186 10*3/MM3 (ref 140–450)
PMV BLD AUTO: 10.3 FL (ref 6–12)
POTASSIUM SERPL-SCNC: 4.7 MMOL/L (ref 3.5–5.2)
PROT SERPL-MCNC: 6.7 G/DL (ref 6–8.5)
RBC # BLD AUTO: 5.25 10*6/MM3 (ref 4.14–5.8)
SODIUM SERPL-SCNC: 138 MMOL/L (ref 136–145)
TRIGL SERPL-MCNC: 57 MG/DL (ref 0–150)
VLDLC SERPL-MCNC: 11 MG/DL (ref 5–40)
WBC NRBC COR # BLD AUTO: 6.48 10*3/MM3 (ref 3.4–10.8)

## 2025-04-29 PROCEDURE — 25810000003 SODIUM CHLORIDE 0.9 % SOLUTION: Performed by: INTERNAL MEDICINE

## 2025-04-29 PROCEDURE — 25510000001 IOPAMIDOL PER 1 ML: Performed by: INTERNAL MEDICINE

## 2025-04-29 PROCEDURE — C1887 CATHETER, GUIDING: HCPCS | Performed by: INTERNAL MEDICINE

## 2025-04-29 PROCEDURE — C1874 STENT, COATED/COV W/DEL SYS: HCPCS | Performed by: INTERNAL MEDICINE

## 2025-04-29 PROCEDURE — 25810000003 SODIUM CHLORIDE 0.9 % SOLUTION: Performed by: PHYSICIAN ASSISTANT

## 2025-04-29 PROCEDURE — 92928 PRQ TCAT PLMT NTRAC ST 1 LES: CPT | Performed by: INTERNAL MEDICINE

## 2025-04-29 PROCEDURE — 25010000002 FENTANYL CITRATE (PF) 50 MCG/ML SOLUTION: Performed by: INTERNAL MEDICINE

## 2025-04-29 PROCEDURE — 93005 ELECTROCARDIOGRAM TRACING: CPT | Performed by: INTERNAL MEDICINE

## 2025-04-29 PROCEDURE — 93458 L HRT ARTERY/VENTRICLE ANGIO: CPT | Performed by: INTERNAL MEDICINE

## 2025-04-29 PROCEDURE — C1894 INTRO/SHEATH, NON-LASER: HCPCS | Performed by: INTERNAL MEDICINE

## 2025-04-29 PROCEDURE — C1769 GUIDE WIRE: HCPCS | Performed by: INTERNAL MEDICINE

## 2025-04-29 PROCEDURE — C1725 CATH, TRANSLUMIN NON-LASER: HCPCS | Performed by: INTERNAL MEDICINE

## 2025-04-29 PROCEDURE — 85347 COAGULATION TIME ACTIVATED: CPT

## 2025-04-29 PROCEDURE — 80053 COMPREHEN METABOLIC PANEL: CPT | Performed by: PHYSICIAN ASSISTANT

## 2025-04-29 PROCEDURE — 25010000002 LIDOCAINE PF 1% 1 % SOLUTION: Performed by: INTERNAL MEDICINE

## 2025-04-29 PROCEDURE — 36415 COLL VENOUS BLD VENIPUNCTURE: CPT

## 2025-04-29 PROCEDURE — 80061 LIPID PANEL: CPT | Performed by: PHYSICIAN ASSISTANT

## 2025-04-29 PROCEDURE — C9600 PERC DRUG-EL COR STENT SING: HCPCS | Performed by: INTERNAL MEDICINE

## 2025-04-29 PROCEDURE — 82565 ASSAY OF CREATININE: CPT

## 2025-04-29 PROCEDURE — 93010 ELECTROCARDIOGRAM REPORT: CPT | Performed by: INTERNAL MEDICINE

## 2025-04-29 PROCEDURE — 25010000002 NICARDIPINE 2.5 MG/ML SOLUTION: Performed by: INTERNAL MEDICINE

## 2025-04-29 PROCEDURE — 63710000001 DIPHENHYDRAMINE PER 50 MG: Performed by: INTERNAL MEDICINE

## 2025-04-29 PROCEDURE — 25010000002 HEPARIN (PORCINE) PER 1000 UNITS: Performed by: INTERNAL MEDICINE

## 2025-04-29 PROCEDURE — 85027 COMPLETE CBC AUTOMATED: CPT | Performed by: PHYSICIAN ASSISTANT

## 2025-04-29 PROCEDURE — 25010000002 MIDAZOLAM PER 1 MG: Performed by: INTERNAL MEDICINE

## 2025-04-29 DEVICE — XIENCE SKYPOINT™ EVEROLIMUS ELUTING CORONARY STENT SYSTEM 3.00 MM X 28 MM / RAPID-EXCHANGE
Type: IMPLANTABLE DEVICE | Site: CORONARY | Status: FUNCTIONAL
Brand: XIENCE SKYPOINT™

## 2025-04-29 RX ORDER — ALPRAZOLAM 0.25 MG
0.25 TABLET ORAL 3 TIMES DAILY PRN
Status: DISCONTINUED | OUTPATIENT
Start: 2025-04-29 | End: 2025-04-29 | Stop reason: HOSPADM

## 2025-04-29 RX ORDER — CLOPIDOGREL BISULFATE 75 MG/1
TABLET ORAL
Status: DISCONTINUED | OUTPATIENT
Start: 2025-04-29 | End: 2025-04-29 | Stop reason: HOSPADM

## 2025-04-29 RX ORDER — HYDROCODONE BITARTRATE AND ACETAMINOPHEN 7.5; 325 MG/1; MG/1
1 TABLET ORAL EVERY 4 HOURS PRN
Refills: 0 | Status: DISCONTINUED | OUTPATIENT
Start: 2025-04-29 | End: 2025-04-29 | Stop reason: HOSPADM

## 2025-04-29 RX ORDER — ASPIRIN 81 MG/1
81 TABLET ORAL DAILY
Status: DISCONTINUED | OUTPATIENT
Start: 2025-04-30 | End: 2025-04-29 | Stop reason: HOSPADM

## 2025-04-29 RX ORDER — ACETAMINOPHEN 325 MG/1
650 TABLET ORAL EVERY 4 HOURS PRN
Status: DISCONTINUED | OUTPATIENT
Start: 2025-04-29 | End: 2025-04-29 | Stop reason: HOSPADM

## 2025-04-29 RX ORDER — NALOXONE HCL 0.4 MG/ML
0.4 VIAL (ML) INJECTION
Status: DISCONTINUED | OUTPATIENT
Start: 2025-04-29 | End: 2025-04-29 | Stop reason: HOSPADM

## 2025-04-29 RX ORDER — FENTANYL CITRATE 50 UG/ML
INJECTION, SOLUTION INTRAMUSCULAR; INTRAVENOUS
Status: DISCONTINUED | OUTPATIENT
Start: 2025-04-29 | End: 2025-04-29 | Stop reason: HOSPADM

## 2025-04-29 RX ORDER — LIDOCAINE HYDROCHLORIDE 10 MG/ML
INJECTION, SOLUTION EPIDURAL; INFILTRATION; INTRACAUDAL; PERINEURAL
Status: DISCONTINUED | OUTPATIENT
Start: 2025-04-29 | End: 2025-04-29 | Stop reason: HOSPADM

## 2025-04-29 RX ORDER — DIPHENHYDRAMINE HYDROCHLORIDE 50 MG/ML
50 INJECTION, SOLUTION INTRAMUSCULAR; INTRAVENOUS
Status: COMPLETED | OUTPATIENT
Start: 2025-04-29 | End: 2025-04-29

## 2025-04-29 RX ORDER — CLOPIDOGREL BISULFATE 75 MG/1
75 TABLET ORAL DAILY
Status: DISCONTINUED | OUTPATIENT
Start: 2025-04-30 | End: 2025-04-29 | Stop reason: HOSPADM

## 2025-04-29 RX ORDER — DIPHENHYDRAMINE HCL 50 MG
50 CAPSULE ORAL
Status: COMPLETED | OUTPATIENT
Start: 2025-04-29 | End: 2025-04-29

## 2025-04-29 RX ORDER — IOPAMIDOL 755 MG/ML
INJECTION, SOLUTION INTRAVASCULAR
Status: DISCONTINUED | OUTPATIENT
Start: 2025-04-29 | End: 2025-04-29 | Stop reason: HOSPADM

## 2025-04-29 RX ORDER — HEPARIN SODIUM 1000 [USP'U]/ML
INJECTION, SOLUTION INTRAVENOUS; SUBCUTANEOUS
Status: DISCONTINUED | OUTPATIENT
Start: 2025-04-29 | End: 2025-04-29 | Stop reason: HOSPADM

## 2025-04-29 RX ORDER — MIDAZOLAM HYDROCHLORIDE 1 MG/ML
INJECTION, SOLUTION INTRAMUSCULAR; INTRAVENOUS
Status: DISCONTINUED | OUTPATIENT
Start: 2025-04-29 | End: 2025-04-29 | Stop reason: HOSPADM

## 2025-04-29 RX ORDER — SODIUM CHLORIDE 9 MG/ML
100 INJECTION, SOLUTION INTRAVENOUS CONTINUOUS
Status: ACTIVE | OUTPATIENT
Start: 2025-04-29 | End: 2025-04-29

## 2025-04-29 RX ORDER — MORPHINE SULFATE 2 MG/ML
1 INJECTION, SOLUTION INTRAMUSCULAR; INTRAVENOUS EVERY 4 HOURS PRN
Status: DISCONTINUED | OUTPATIENT
Start: 2025-04-29 | End: 2025-04-29 | Stop reason: HOSPADM

## 2025-04-29 RX ORDER — ASPIRIN 81 MG/1
324 TABLET, CHEWABLE ORAL ONCE
Status: COMPLETED | OUTPATIENT
Start: 2025-04-29 | End: 2025-04-29

## 2025-04-29 RX ORDER — CLOPIDOGREL BISULFATE 75 MG/1
75 TABLET ORAL DAILY
Qty: 90 TABLET | Refills: 3 | Status: SHIPPED | OUTPATIENT
Start: 2025-04-29

## 2025-04-29 RX ORDER — NITROGLYCERIN 0.4 MG/1
0.4 TABLET SUBLINGUAL
Status: DISCONTINUED | OUTPATIENT
Start: 2025-04-29 | End: 2025-04-29 | Stop reason: HOSPADM

## 2025-04-29 RX ORDER — CLOPIDOGREL BISULFATE 75 MG/1
600 TABLET ORAL ONCE
Status: DISCONTINUED | OUTPATIENT
Start: 2025-04-29 | End: 2025-04-29 | Stop reason: HOSPADM

## 2025-04-29 RX ADMIN — SODIUM CHLORIDE 330 ML: 9 INJECTION, SOLUTION INTRAVENOUS at 11:25

## 2025-04-29 RX ADMIN — DIPHENHYDRAMINE HYDROCHLORIDE 50 MG: 50 CAPSULE ORAL at 11:24

## 2025-04-29 RX ADMIN — ASPIRIN 81 MG 243 MG: 81 TABLET ORAL at 11:24

## 2025-04-29 NOTE — Clinical Note
First balloon inflation max pressure = 12 sandrine. First balloon inflation duration = 20 seconds. Second inflation of balloon - Max pressure = 12 sandrine. 2nd Inflation of balloon - Duration = 20 seconds. The balloon is positioned in the Mid segment of the vessel. Third inflation of balloon - Max pressure = 12 sandrine. 3rd Inflation of balloon - Duration = 20 seconds. The balloon is positioned in the Mid segment of the vessel.

## 2025-04-29 NOTE — INTERVAL H&P NOTE
"  H&P reviewed. The patient was examined and there are no changes to the H&P.      BP (!) 174/108 (BP Location: Right arm)   Pulse 68   Temp 97.6 °F (36.4 °C) (Temporal)   Resp 16   Ht 182.9 cm (72\")   Wt 114 kg (251 lb 9.6 oz)   SpO2 96%   BMI 34.12 kg/m²      Patient is a 69-year-old history of chest pain with history of normal heart catheterization 2009, hypertension, hyperlipidemia and type 2 diabetes.  Patient was seen on 4/23 after having a mildly abnormal stress test with a small area of inferior ischemia.  Patient still continues to have shortness of breath with exertion and chest pain so he is here for left heart catheterization.  Patient also continues to have some lower extremity pain and numbness.  He has a stress YULY that is ordered and to be done in a couple of weeks.  Dr. Fang discussed the procedure and risk of the procedure with the patient he agrees to proceed.  Further recommendations to be made postprocedure by Dr. Fang.    Electronically signed by Gardenia Leggett PA-C, 04/29/25, 11:47 AM EDT.         Rebecca Fang MD, FACC    "

## 2025-04-29 NOTE — Clinical Note
Hemostasis started on the right radial artery. R-Band was used in achieving hemostasis. Radial compression device applied to vessel. Hemostasis achieved successfully. Closure device additional comment: 11 cc air

## 2025-04-29 NOTE — Clinical Note
First balloon inflation max pressure = 18 sandrine. First balloon inflation duration = 20 seconds. Second inflation of balloon - Max pressure = 18 sandrine. 2nd Inflation of balloon - Duration = 20 seconds. 2nd inflation was done at 14:19 EDT. The balloon is positioned in the Mid segment of the vessel.

## 2025-04-30 ENCOUNTER — TELEPHONE (OUTPATIENT)
Dept: CARDIOLOGY | Facility: CLINIC | Age: 70
End: 2025-04-30
Payer: MEDICARE

## 2025-04-30 ENCOUNTER — DOCUMENTATION (OUTPATIENT)
Dept: CARDIAC REHAB | Facility: HOSPITAL | Age: 70
End: 2025-04-30
Payer: MEDICARE

## 2025-04-30 ENCOUNTER — CALL CENTER PROGRAMS (OUTPATIENT)
Dept: CALL CENTER | Facility: HOSPITAL | Age: 70
End: 2025-04-30
Payer: MEDICARE

## 2025-04-30 NOTE — TELEPHONE ENCOUNTER
Patient spoke with procedure .  He told her he thought his incision was infected.  Spoke with patient.  He reports redness of the skin on two sides of his bandage on his right wrist.  The skin does not feel hot and there is no drainage noted on the dressing.  No fever or pain.  Patient has been instructed to remove his bandage tomorrow morning.  Patient is advised the redness is the start of a bruise.  He is to call tomorrow if there is any change when he takes off the bandage.  He verbalizes understanding.

## 2025-04-30 NOTE — OUTREACH NOTE
PCI/Device Survey      Flowsheet Row Responses   Facility patient discharged from? [place]   Procedure date 04/29/25   Procedure (if device, specify in description) PCI   PCI site Right, Arm  [right radial]   Performing MD Dr. Rebecca Fang   Attempt successful? Yes   Call start time 0925   Call end time 0932   Has the patient had any of the following symptoms since discharge? --  [Denies any symptoms. Patient reports that he is using a cane for ambulation because his legs are still recovering from procedure.]   Is the patient taking prescribed medications: ASA, Plavix   Nursing intervention Reminded to continue to take prescribed medications   Does the patient have any of the following symptoms related to the cath/surgical site? --  [Dressing remains clean and intact to right wrist.]   Does the patient have an appointment scheduled with the cardiologist? Yes   Appointment comments Hospital Follow Up with JODI Garcia  Wednesday Jun 11, 2025 1:00 PM   If the patient is a current smoker, are they able to teach back resources for cessation? Not a smoker   Did the patient feel prepared to go home on the same day as the procedure? Yes   Is the patient satisfied with the same day discharge process? Yes   PCI/Device call completed Yes   Wrap up additional comments No further questions            YOLY LAMBERT - Registered Nurse

## 2025-05-01 ENCOUNTER — TRANSCRIBE ORDERS (OUTPATIENT)
Dept: CARDIAC REHAB | Facility: HOSPITAL | Age: 70
End: 2025-05-01
Payer: MEDICARE

## 2025-05-01 ENCOUNTER — TELEPHONE (OUTPATIENT)
Dept: CARDIAC REHAB | Facility: HOSPITAL | Age: 70
End: 2025-05-01
Payer: MEDICARE

## 2025-05-01 DIAGNOSIS — Z95.5 STENTED CORONARY ARTERY: Primary | ICD-10-CM

## 2025-05-01 NOTE — TELEPHONE ENCOUNTER
Staff called patient in regard to Phase II Cardiac Rehab. Pt. Referred for Phase II Cardiac Rehab. Staff discussed benefits of exercise and program protocol. Teach back verified.  Patient scheduled for orientation at [facility] on Tuesday, May 20th 2025 at 1:00pm.

## 2025-05-02 LAB
QT INTERVAL: 442 MS
QTC INTERVAL: 459 MS

## 2025-05-09 ENCOUNTER — TELEPHONE (OUTPATIENT)
Dept: CARDIOLOGY | Facility: CLINIC | Age: 70
End: 2025-05-09
Payer: MEDICARE

## 2025-05-09 NOTE — TELEPHONE ENCOUNTER
Caller: Bhaskar Torres    Relationship: Self    Best call back number: [phone]     What is the best time to reach you: ANY    Who are you requesting to speak with (clinical staff, provider,  specific staff member): CLINICAL    What was the call regarding: PATIENT'S WIFE CALLED TO REQUEST THAT THE OFFICE CALL AND CHECK ON THE PATIENT. SHE STATES HE HAS BEEN ON A CANE EVERY SINCE HIS STENT PROCEDURE AND HE HAS RECENTLY BEEN EXPERIENCING A PERSISTENT COUGH,  BACK PAIN AND CHEST PAIN BUT REFUSES TO GO THE E.D. PATIENT'S WIFE IS REQUESTING THAT THE OFFICE NOT DIVULGE THAT SHE HAS REQUESTED THIS. SHE ALSO REQUESTING THAT HIS F/U APPT BE MOVED UP TO AN EARLIER DATE.    Is it okay if the provider responds through [employer]: PLEASE CALL

## 2025-05-09 NOTE — TELEPHONE ENCOUNTER
Spoke with patient. Patient states since [facility] he has been having issues walking. Reports he has had this issue before after procedures and it will resolve on its own. Reports he has a bad back and when he has to lay on a table for a long period of time, his legs with be numb for several days. He reports his legs are feeling better at this time.     Patient states that he has had issues with allergies since [facility]. Reports he has been coughing a lot. States last weeks he was coughing a deep orange mucus. This week, his mucus is clear with a tinge of green. He went to PCP last week, who prescribed him antibiotics for this concern. Patient states antibiotics have not helped much, but taking Claritin D has helped more.      Patient states that he has been experiencing pain around left shoulder and left side of chest. Reports pain started a few days ago. States pain is worse with movements and coughing. Reports pain feels more muscular. Advised patient to continue to monitor symptoms, but if chest pain gets worse to go to ED. Patient verbalizes understanding.     Patient had concerns about elevated HR. Reports his baseline HR is 64. Reports since [facility], HR has been ranging from . Denies fever. Denies SOB or palpitations. Advised patient that this could be due to allergies/coughing. Advised patient to continue to monitor, but go to ED if symptoms get worse. Patient verbalizes understanding.

## 2025-05-20 ENCOUNTER — TREATMENT (OUTPATIENT)
Dept: CARDIAC REHAB | Facility: HOSPITAL | Age: 70
End: 2025-05-20
Payer: MEDICARE

## 2025-05-20 DIAGNOSIS — Z95.5 STENTED CORONARY ARTERY: ICD-10-CM

## 2025-05-20 PROCEDURE — 93798 PHYS/QHP OP CAR RHAB W/ECG: CPT

## 2025-05-20 NOTE — PROGRESS NOTES
Pt attended Phase II Orientation.  Heart and lung assessment completed per RN and within normal limits.  Fall risk assessment completed.  Patient noted to staff that he stopped taking his Toprol XL 100mg daily and his Norvasc 5mg daily because he doesn't have blood pressure problems anymore. Staff informed Dr. Garcia's RN. See [facility] for details.

## 2025-05-21 NOTE — PROGRESS NOTES
"  NUTRITION ASSESSMENT & EDUCATION  CARDIAC/PULMONARY REHAB      Appointment Date and Time: 05/21/25, 15:43 EDT  Patient Name: Bhaskar Torres   Age: 69 y.o.     Sex:  male      Employment/Activity Level:   Bhaskar's education level: MS  Occupation:  retired/unemployed, former [employer]                          Job Activity Level: mild  Routine Exercise: mild- reports leg pain/weakness                                    Weight Assessment:   Height:   Ht Readings from Last 1 Encounters:   04/29/25 182.9 cm (72\")     Weight:   Wt Readings from Last 1 Encounters:   04/29/25 114 kg (251 lb 9.6 oz)         BMI:    BMI Readings from Last 1 Encounters:   04/29/25 34.12 kg/m²       -------------------------------------------------------------------------------------------------  IBW: Ideal body weight: 77.6 kg (171 lb 1.2 oz)  Adjusted ideal body weight: 92.2 kg (203 lb 4.6 oz)  -------------------------------------------------------------------------------------------------  Weight Assessment: Obese  Weight Change last six months: 20 lb wt loss       Usual Weight: 265- 245 lb  intentional loss      Desired Weight: 215 lb    Cardiac Risk Factors:         Atherosclerotic heart disease       Stents       Hyperlipidemia/hypercholesterolemia       Hypertension      Gout, DM II, kidney stone, arthritis, prostate Ca    Pertinent Lab Values:     Total Cholesterol   Date Value Ref Range Status   04/29/2025 170 0 - 200 mg/dL Final     HDL Cholesterol   Date Value Ref Range Status   04/29/2025 49 40 - 60 mg/dL Final     LDL Cholesterol    Date Value Ref Range Status   04/29/2025 110 (H) 0 - 100 mg/dL Final     LDL/HDL Ratio   Date Value Ref Range Status   04/29/2025 2.24  Final     Triglycerides   Date Value Ref Range Status   04/29/2025 57 0 - 150 mg/dL Final     Hemoglobin A1C   Date Value Ref Range Status   03/13/2025 7.90 (H) 4.80 - 5.60 % Final   12/02/2024 7.6 <5.7% Non-Diabetic % Final     TSH   Date Value Ref " Range Status   03/13/2025 1.190 0.270 - 4.200 uIU/mL Final     Glucose   Date Value Ref Range Status   04/29/2025 341 (H) 65 - 99 mg/dL Final     Labs reviewed with Pt today. He has had unsuccessful response to [facility] program with return calls.  He is seeking a new Endocrinologist. Has stopped taking Metformin - reports it caused constipation. Does not always take long acting insulin,  He does adjust dosing of short acting insulin based on readings. He does check his CGM readings several times/day.  He does not understand how the A1c can be accurate if only taken once every few months    Pertinent Medications:   Nutritional Supplements: reviewed  Pertinent Nutrition-Related Medications: Reviewed - no changes recommended at this time.  Self Identified Problems or Concerns:   I cannot tolerate (GI issues reported) water or sugar free beverages. I don't like many vegetables, don't like many fruits.  Don't eat nuts or beans. I do eat TV dinners 2+ times/day, I have bourbon 3 times/day with regular pepsi. If I drive by a fast food restaurant- I often stop and get a snack - a burger, fries, soft drink.    Nutrition Influences:   Appetite: good            Taste/smell changes:  No  Factors limiting PO intake: none    Food records reviewed?: Yes, completed review of 'Rate Your Plate' score and tallies.  Extensive review and discussion of home diet today.    Bhaskar lives with: armand Muro receives lifestyle support from others at home?: No- they eat different foods/meals  Spouse/significant other present for diet instruction today?: No    Diet Assessment:   Diet Survey Score: 35 of possible 72 =  49 % compliant with AHA guidelines    Meal intake pattern:  three meals/day- mostly frozen processed foods, sugary beverages  Dining out:  no               Fast food:  yes    24 hour recall: available in chart  Who does the grocery shopping:  wife Helena  Who does the cooking at home:  self - fixes own meals                      Home diet review:  Current diet has a moderate intake of Saturated Fats, still with room for improvement, Excessive Sodium intake, not in compliance with recommended sodium levels, High in eating out- restaurant and/or fast food- potential for excess portioning, sodium, saturated fats, Frequent use of Commercially Prepared foods- potential for excess sodium, saturated fats, trans fats, Deficient in one or more food categories, Difficulty in choosing dietary changes at this time, and Anticipate little-moderate change in diet at this time    Motivation level toward diet compliance:  weak  Assessment / Recommendations:   Prior diet education before to coming to Cardiac Rehab?: Yes diabetes educations  Instructed provided on:  American Heart Association 2021 Nutrition Guidelines, Saturated Fat, Get the Scoop on Sodium/Salt <2300 mg/d, Added Sugars Guidance, Go Nuts for Heart Health, [place] 3 fats in Fish & Seafood, Dining Out Doesn't Mean Ditch Your Diet, and Foods to Avoid on the Cardiac Diet  Written materials provided to patient: Yes    Goals/Plan:   Patient defined goals: Patient has a lot of current eating habits that need to be adjusted before delving into basic cardiac nutrition recommendations.  Today, he made a list of foods he is willing to begin eating this week and I will follow up with him next Wed to ask about whether he was able to follow through  1) apples, bananas, cut up fruit  2) add peanuts and peanut butter  3) avoid regular pepsi and juices (he reports unable to tolerate SF beverages or water)    Plan:  Encouraged to complete goals and continue setting new nutrition/exercise goals             Encouraged to follow up with dietitian during cardiac rehab sessions; may request additional RD counseling.    Moriah De La Cruz RD, 15:43 EDT -  5/21/2025                                                                                                                                                                                                                                                                                                                                                                                                                                                                                                                                                                                                                                                                                                                                                                                                                                                                                                                                                                                                                                                                                                                                                                                                                                                                                                                                                                                                                                                                                                                                                                                                                                                                                                                                                                                                                                                                                                                                                                                                                                                                                                                                                                                                                                                                                                                                                                                                                                                                                                                                                                                                                                                                                                                                                                                                                                                                                                                                                                                                                                                                                                                                                                                                                                                                                                                                                                                                                                                                                                                                                                                                                                                                                                                                                                                                                                                                             .0.  .  +0                                                                                                                              .      .                                                                                                                                                                                                                                                                                                                                                                                                                                                                                                                                                                                                                                                                                                                                                                                                                                                                                               ................................................................................

## 2025-05-22 ENCOUNTER — TREATMENT (OUTPATIENT)
Dept: CARDIAC REHAB | Facility: HOSPITAL | Age: 70
End: 2025-05-22
Payer: MEDICARE

## 2025-05-22 DIAGNOSIS — Z95.5 STENTED CORONARY ARTERY: Primary | ICD-10-CM

## 2025-05-22 PROCEDURE — 93798 PHYS/QHP OP CAR RHAB W/ECG: CPT

## 2025-05-22 PROCEDURE — 93797 PHYS/QHP OP CAR RHAB WO ECG: CPT

## 2025-05-22 NOTE — PROGRESS NOTES
Attended Phase II Intro to Exercise class and Cardiac Rehab session. No medication or health history changes reported. See [facility] for details.

## 2025-05-23 ENCOUNTER — HOSPITAL ENCOUNTER (OUTPATIENT)
Dept: CARDIOLOGY | Facility: HOSPITAL | Age: 70
Discharge: HOME | End: 2025-05-23
Payer: MEDICARE

## 2025-05-23 VITALS — BODY MASS INDEX: 33.44 KG/M2 | HEIGHT: 72 IN | WEIGHT: 246.91 LBS

## 2025-05-23 DIAGNOSIS — I73.9 CLAUDICATION OF CALF MUSCLES: ICD-10-CM

## 2025-05-23 DIAGNOSIS — M79.605 PAIN IN BOTH LOWER EXTREMITIES: ICD-10-CM

## 2025-05-23 DIAGNOSIS — M79.604 PAIN IN BOTH LOWER EXTREMITIES: ICD-10-CM

## 2025-05-23 LAB
BH CV LOWER ARTERIAL LEFT ABI RATIO: 1.12
BH CV LOWER ARTERIAL LEFT DORSALIS PEDIS SYS MAX: 168
BH CV LOWER ARTERIAL LEFT GREAT TOE SYS MAX: 129
BH CV LOWER ARTERIAL LEFT POST TIBIAL SYS MAX: 182
BH CV LOWER ARTERIAL LEFT TBI RATIO: 0.8
BH CV LOWER ARTERIAL RIGHT ABI RATIO: 1.11
BH CV LOWER ARTERIAL RIGHT DORSALIS PEDIS SYS MAX: 170
BH CV LOWER ARTERIAL RIGHT GREAT TOE SYS MAX: 147
BH CV LOWER ARTERIAL RIGHT POST TIBIAL SYS MAX: 180
BH CV LOWER ARTERIAL RIGHT TBI RATIO: 0.91
UPPER ARTERIAL LEFT ARM BRACHIAL SYS MAX: 162
UPPER ARTERIAL RIGHT ARM BRACHIAL SYS MAX: 151

## 2025-05-23 PROCEDURE — 93924 LWR XTR VASC STDY BILAT: CPT

## 2025-05-27 ENCOUNTER — RESULTS FOLLOW-UP (OUTPATIENT)
Dept: CARDIOLOGY | Facility: CLINIC | Age: 70
End: 2025-05-27
Payer: MEDICARE

## 2025-05-28 ENCOUNTER — TREATMENT (OUTPATIENT)
Dept: CARDIAC REHAB | Facility: HOSPITAL | Age: 70
End: 2025-05-28
Payer: MEDICARE

## 2025-05-28 DIAGNOSIS — Z95.5 STENTED CORONARY ARTERY: Primary | ICD-10-CM

## 2025-05-28 PROCEDURE — 93798 PHYS/QHP OP CAR RHAB W/ECG: CPT

## 2025-05-28 PROCEDURE — 93797 PHYS/QHP OP CAR RHAB WO ECG: CPT

## 2025-05-28 NOTE — PROGRESS NOTES
Attended Phase II Cardiac Rehab. No medication or health history changes reported. See [facility] for details.    Exercise session #4  RD session #5

## 2025-05-28 NOTE — TELEPHONE ENCOUNTER
Attempted to contact patient about results. Left detailed message about results. Left office number to return call with any questions.

## 2025-05-30 ENCOUNTER — TREATMENT (OUTPATIENT)
Dept: CARDIAC REHAB | Facility: HOSPITAL | Age: 70
End: 2025-05-30
Payer: MEDICARE

## 2025-05-30 DIAGNOSIS — Z95.5 STENTED CORONARY ARTERY: Primary | ICD-10-CM

## 2025-05-30 PROCEDURE — 93798 PHYS/QHP OP CAR RHAB W/ECG: CPT

## 2025-06-02 ENCOUNTER — TREATMENT (OUTPATIENT)
Dept: CARDIAC REHAB | Facility: HOSPITAL | Age: 70
End: 2025-06-02
Payer: MEDICARE

## 2025-06-02 DIAGNOSIS — Z95.5 STENTED CORONARY ARTERY: Primary | ICD-10-CM

## 2025-06-02 PROCEDURE — 93798 PHYS/QHP OP CAR RHAB W/ECG: CPT

## 2025-06-04 ENCOUNTER — TREATMENT (OUTPATIENT)
Dept: CARDIAC REHAB | Facility: HOSPITAL | Age: 70
End: 2025-06-04
Payer: MEDICARE

## 2025-06-04 DIAGNOSIS — Z95.5 STENTED CORONARY ARTERY: Primary | ICD-10-CM

## 2025-06-04 PROCEDURE — 93798 PHYS/QHP OP CAR RHAB W/ECG: CPT

## 2025-06-06 ENCOUNTER — TREATMENT (OUTPATIENT)
Dept: CARDIAC REHAB | Facility: HOSPITAL | Age: 70
End: 2025-06-06
Payer: MEDICARE

## 2025-06-06 DIAGNOSIS — Z95.5 STENTED CORONARY ARTERY: Primary | ICD-10-CM

## 2025-06-06 PROCEDURE — 93798 PHYS/QHP OP CAR RHAB W/ECG: CPT

## 2025-06-09 ENCOUNTER — TELEPHONE (OUTPATIENT)
Dept: CARDIAC REHAB | Facility: HOSPITAL | Age: 70
End: 2025-06-09
Payer: MEDICARE

## 2025-06-09 ENCOUNTER — APPOINTMENT (OUTPATIENT)
Dept: CARDIAC REHAB | Facility: HOSPITAL | Age: 70
End: 2025-06-09
Payer: MEDICARE

## 2025-06-09 NOTE — TELEPHONE ENCOUNTER
Patient calls in to cancel today's session and all future sessions stating he is going to create his own home gym.   Staff reinforces that he was monitored on telemetry here in Phase II and he states an understanding.  All future appointments are cancelled.

## 2025-06-10 NOTE — PROGRESS NOTES
"[facility] CARDIOLOGY    Established Patient Office Visit    Patient Name: Bhaskar Torres  : 1955   MRN: [id number]   Care Team: Patient Care Team:  Ramesh Trivdei MD as PCP - General (General Practice)  Rafa Tam MD as Consulting Physician (Hematology and Oncology)    Chief Complaint   Patient presents with    Pain in both lower extremities     Coronary artery disease   - possible \"silent\" heart attack in the past   3/17/09 - [facility], [place], [place]  reportedly normal  Negative stress testing in [place]  3/31/25 - SPECT Stress - EF 64%, inferior / septal photopenia, SSS 5, TID 1.3  25 - [facility], Dr. Fang - RCA , PTCA/MARI to OM1, small diagonal with 80%  Hypertension  DM II  Leg pain on exertion   25 - Normal YULY, exercise time 3 min  HLD       Subjective   HPI: Bhaskar Torres is a 69 y.o. male with a history of HLD, DM, HTN who presents today for routine follow-up of coronary artery disease after heart catheterization and PCI in April.    Since that time he continues to have some issues of atypical chest pain.  He describes having distinct patterns with some located in his right sides of his left lower chest.  He did have 1 pattern with left upper chest/shoulder pain with radiation down to his arm which now seems to have resolved after PCI.  He does note that he continues to have leg weakness and often uses assistive devices to help him get out of the tub.  However, since his catheterization he has noticed persistent leg weakness in his left.  He has had an episode in the past where his \"back went out\" that had left him with temporary weakness that had resolved spontaneously.  However, he has been ambulating with a cane since his catheterization was done.    Review of Systems   Constitutional:  Negative for activity change.   Respiratory:  Negative for shortness of breath.    Cardiovascular:  Positive for chest pain. Negative for palpitations and leg swelling. "   Neurological:  Positive for weakness and memory problem.       Past Medical History:   Diagnosis Date    Arthritis     Asthma     Bronchitis     Diabetes mellitus     Gout     Hyperlipidemia     Hypertension     Kidney stones     Prostate cancer      Past Surgical History:   Procedure Laterality Date    ADENOIDECTOMY      CARDIAC CATHETERIZATION N/A 4/29/2025    Procedure: Left Heart Cath;  Surgeon: Rebecca Fang MD;  Location:  [facility];  Service: Cardiovascular;  Laterality: N/A;    CARDIAC CATHETERIZATION N/A 4/29/2025    Procedure: Stent MARI coronary;  Surgeon: Rebecca Fang MD;  Location:  [facility];  Service: Cardiovascular;  Laterality: N/A;    ENDOSCOPY N/A 10/14/2023    Procedure: ESOPHAGOGASTRODUODENOSCOPY;  Surgeon: Samy Real MD;  Location:  [facility];  Service: Gastroenterology;  Laterality: N/A;    HERNIA REPAIR      PROSTATECTOMY      THYROID SURGERY Left     TONSILLECTOMY       Social History     Socioeconomic History    Marital status:    Tobacco Use    Smoking status: Never     Passive exposure: Never    Smokeless tobacco: Never   Vaping Use    Vaping status: Never Used   Substance and Sexual Activity    Alcohol use: Yes     Alcohol/week: 21.0 standard drinks of alcohol     Types: 21 Shots of liquor per week    Drug use: Never    Sexual activity: Defer       Current Outpatient Medications:     acetaminophen (TYLENOL) 500 MG tablet, Take 2 tablets by mouth Every Morning. (Patient taking differently: Take 2 tablets by mouth Every Morning. PRN), Disp: , Rfl:     allopurinol (ZYLOPRIM) 100 MG tablet, Take 1 tablet by mouth Daily., Disp: , Rfl:     amLODIPine (NORVASC) 5 MG tablet, Take 1 tablet by mouth Every Night., Disp: , Rfl:     aspirin 81 MG EC tablet, Take 1 tablet by mouth 2 (Two) Times a Week. PRN, Disp: , Rfl:     atorvastatin (LIPITOR) 80 MG tablet, Take 1 tablet by mouth Daily., Disp: , Rfl:     Cholecalciferol 25  "MCG (1000 UT) tablet, Take 1 tablet by mouth Every 30 (Thirty) Days., Disp: , Rfl:     clopidogrel (PLAVIX) 75 MG tablet, Take 1 tablet by mouth Daily., Disp: 90 tablet, Rfl: 3    docusate sodium (COLACE) 100 MG capsule, Take 1 capsule by mouth As Needed for Constipation., Disp: , Rfl:     Insulin Glargine (LANTUS SOLOSTAR) 100 UNIT/ML injection pen, Inject 30 Units under the skin into the appropriate area as directed Every Night., Disp: , Rfl:     Insulin Lispro, 1 Unit Dial, (HUMALOG) 100 UNIT/ML solution pen-injector, Inject 10 Units under the skin into the appropriate area as directed., Disp: , Rfl:     loratadine (CLARITIN) 10 MG tablet, Take 1 tablet by mouth Daily As Needed., Disp: , Rfl:     metoprolol succinate XL (TOPROL-XL) 100 MG 24 hr tablet, Take 1 tablet by mouth Daily., Disp: , Rfl:     Multiple Vitamins-Minerals (AIRBORNE PO), Take  by mouth As Needed., Disp: , Rfl:     Pediatric Multiple Vitamins (FLINTSTONES MULTIVITAMIN PO), Take  by mouth Daily., Disp: , Rfl:     potassium chloride 10 MEQ CR tablet, Take 1 tablet by mouth Every 30 (Thirty) Days., Disp: , Rfl:     predniSONE (DELTASONE) 50 MG tablet, Take 1 tablet by mouth Take As Directed for 3 doses. Take 1 tablet (50mg) 13 Hours 7 Hours & 1 Hour Prior to Exam, Disp: 3 tablet, Rfl: 0    tacrolimus (PROTOPIC) 0.1 % ointment, APPLY A THIN LAYER TO THE AFFECTED AREA(S) BY TOPICAL ROUTE 2 TIMES PER DAY ; RUB IN GENTLY AND COMPLETELY, Disp: , Rfl:     Allergies   Allergen Reactions    Amoxicillin Other (See Comments)     Causes hair to fall out    Glipizide Other (See Comments)     Weight gain    Ct Contrast Other (See Comments)     Body aches    Metformin Other (See Comments)     constipation        Objective     Vitals:    06/11/25 1301   BP: 120/78   BP Location: Left arm   Patient Position: Sitting   Cuff Size: Adult   Pulse: 56   SpO2: 95%   Weight: 110 kg (243 lb)   Height: 182.9 cm (72\")   Body mass index is 32.96 kg/m².  Gen: well developed, " sitting up on exam table, comfortable appearing  HEENT: MMM, sclera anicteric, conjunctiva normal, no carotid bruits  CV: regular rate, regular rhythm, no murmurs or rubs, normal S1, S2. 2+ radial and PT pulses  Pulm: RA, normal work of breathing, no wheezes, rales, rhonchi  Ext: normal bulk for age, normal tone, no dependent edema  Neuro: alert, oriented, face symmetrical, does have difficulty maintaining left leg extension against gravity and breaks to minimal pressure  Psych: normal mood, appropriate affect    Most recent PCP note, imaging tests, and labs reviewed.    Labs:  Lab Results   Component Value Date    WBC 6.48 04/29/2025    HGB 15.9 04/29/2025    HCT 46.7 04/29/2025    MCV 89.0 04/29/2025     04/29/2025     Lab Results   Component Value Date    GLUCOSE 341 (H) 04/29/2025    BUN 27 (H) 04/29/2025    CREATININE 1.30 04/29/2025    EGFRIFNONA 68 10/12/2021    EGFRIFAFRI 83 10/12/2021    BCR 21.4 04/29/2025    K 4.7 04/29/2025    CO2 22.0 04/29/2025    CALCIUM 9.7 04/29/2025    ALBUMIN 4.7 04/29/2025    AST 23 04/29/2025    ALT 25 04/29/2025     Lab Results   Component Value Date    HGBA1C 7.90 (H) 03/13/2025     Lab Results   Component Value Date    CHOL 170 04/29/2025    TRIG 57 04/29/2025    HDL 49 04/29/2025     (H) 04/29/2025       ECG 12 Lead    Date/Time: 6/11/2025 2:28 PM  Performed by: Samy Garcia MD    Authorized by: Samy Garcia MD  Comparison: compared with previous ECG from 5/2/2025  Similar to previous ECG  Rhythm: sinus bradycardia  Rate: bradycardic  BPM: 56  Q waves: III and aVF    QRS axis: normal    Clinical impression: abnormal EKG          Advance Care Planning   ACP discussion was declined by the patient. Patient has an advance directive in EMR which is still valid.           Assessment & Plan       ICD-10-CM ICD-9-CM   1. Weakness of left lower extremity  R29.898 729.89   2. Coronary artery disease of native artery of native heart with stable angina pectoris   I25.118 414.01     413.9   3. Primary hypertension  I10 401.9   4. Mixed hyperlipidemia  E78.2 272.2        Coronary artery disease, PCI of OM1,  of RCA   - Describes multiple patterns of chest pain, unclear which if any of these are anginal   - Continue current antianginal regimen with trial of as needed nitroglycerin to see if this can improve some of his symptoms   - If his pain does seem to improve with nitroglycerin could consider long-acting nitro or ranolazine   - Continue aspirin, clopidogrel, high intensity statin   - Last LDL above goal recheck    Left leg weakness   - With persistent symptoms will refer for MRI of the brain.  Did discuss with the patient that stroke can be an uncommon complication of heart catheterization and with his persistent symptoms would recommend working this up further.    Leg pain on exertion   - normal vascular exam at rest, normal YULY   - Suspect this is related to arthritis or neuropathy.  Reportedly he has been evaluated by a neurologist in the past    HTN   - controlled     HLD   - statin as above    Return in about 3 months (around 9/11/2025).    JODI Garcia MD  06/11/2025     [facility] Cardiology  [facility] [address] [address] [phone]

## 2025-06-11 ENCOUNTER — APPOINTMENT (OUTPATIENT)
Dept: CARDIAC REHAB | Facility: HOSPITAL | Age: 70
End: 2025-06-11
Payer: MEDICARE

## 2025-06-11 ENCOUNTER — OFFICE VISIT (OUTPATIENT)
Dept: CARDIOLOGY | Facility: CLINIC | Age: 70
End: 2025-06-11
Payer: MEDICARE

## 2025-06-11 VITALS
BODY MASS INDEX: 32.91 KG/M2 | OXYGEN SATURATION: 95 % | DIASTOLIC BLOOD PRESSURE: 78 MMHG | HEIGHT: 72 IN | WEIGHT: 243 LBS | HEART RATE: 56 BPM | SYSTOLIC BLOOD PRESSURE: 120 MMHG

## 2025-06-11 DIAGNOSIS — I25.118 CORONARY ARTERY DISEASE OF NATIVE ARTERY OF NATIVE HEART WITH STABLE ANGINA PECTORIS: ICD-10-CM

## 2025-06-11 DIAGNOSIS — R29.898 WEAKNESS OF LEFT LOWER EXTREMITY: Primary | ICD-10-CM

## 2025-06-11 DIAGNOSIS — E78.2 MIXED HYPERLIPIDEMIA: ICD-10-CM

## 2025-06-11 DIAGNOSIS — I10 PRIMARY HYPERTENSION: ICD-10-CM

## 2025-06-11 RX ORDER — NITROGLYCERIN 0.4 MG/1
TABLET SUBLINGUAL
Qty: 30 TABLET | Refills: 11 | Status: SHIPPED | OUTPATIENT
Start: 2025-06-11

## 2025-06-13 ENCOUNTER — APPOINTMENT (OUTPATIENT)
Dept: CARDIAC REHAB | Facility: HOSPITAL | Age: 70
End: 2025-06-13
Payer: MEDICARE

## 2025-06-16 ENCOUNTER — APPOINTMENT (OUTPATIENT)
Dept: CARDIAC REHAB | Facility: HOSPITAL | Age: 70
End: 2025-06-16
Payer: MEDICARE

## 2025-06-18 ENCOUNTER — APPOINTMENT (OUTPATIENT)
Dept: CARDIAC REHAB | Facility: HOSPITAL | Age: 70
End: 2025-06-18
Payer: MEDICARE

## 2025-06-20 ENCOUNTER — APPOINTMENT (OUTPATIENT)
Dept: CARDIAC REHAB | Facility: HOSPITAL | Age: 70
End: 2025-06-20
Payer: MEDICARE

## 2025-06-23 ENCOUNTER — APPOINTMENT (OUTPATIENT)
Dept: CARDIAC REHAB | Facility: HOSPITAL | Age: 70
End: 2025-06-23
Payer: MEDICARE

## 2025-07-03 ENCOUNTER — RESULTS FOLLOW-UP (OUTPATIENT)
Dept: CARDIOLOGY | Facility: CLINIC | Age: 70
End: 2025-07-03
Payer: MEDICARE

## 2025-07-03 DIAGNOSIS — R26.89 OTHER ABNORMALITIES OF GAIT AND MOBILITY: ICD-10-CM

## 2025-07-03 DIAGNOSIS — R93.89 ABNORMAL FINDING OF DIAGNOSTIC IMAGING: Primary | ICD-10-CM

## 2025-07-08 ENCOUNTER — OFFICE VISIT (OUTPATIENT)
Dept: NEUROLOGY | Facility: CLINIC | Age: 70
End: 2025-07-08
Payer: MEDICARE

## 2025-07-08 VITALS
WEIGHT: 242 LBS | SYSTOLIC BLOOD PRESSURE: 134 MMHG | BODY MASS INDEX: 32.78 KG/M2 | HEART RATE: 63 BPM | OXYGEN SATURATION: 97 % | DIASTOLIC BLOOD PRESSURE: 80 MMHG | HEIGHT: 72 IN | TEMPERATURE: 98.3 F

## 2025-07-08 DIAGNOSIS — R93.0 ABNORMAL MRI OF THE HEAD: Primary | ICD-10-CM

## 2025-07-08 NOTE — PROGRESS NOTES
New Patient Office Visit      Encounter Date: 2025   Patient Name: Bhaskar Torres  : 1955   MRN: [id number]   PCP: Ramesh Trivedi MD    Referring Provider: No ref. provider found     Chief Complaint:    Chief Complaint   Patient presents with    Establish Care for work up regarding abnormal MRI brain w/o contrast        History of Present Illness: Bhaskar Torres is a 70 y.o. male who is here today to establish care.  Patient has prior medical history of hypertension, hyperlipidemia, type 2 diabetes, recent cardiac cath with stenting.  Patient was referred to our office per cardiology due to abnormal outpatient MRI.  Patient reports that upon awakening from sedation following his cardiac cath he noticed that both of his legs felt like rubber.  Particularly his left leg.  He found that he had left leg weakness and foot drop.  It was felt likely that patient may have had an ischemic event during his cardiac catheterization.  A CT scan was done on an outpatient basis earlier in the year due to the patient falling and hitting his head at that time there was no evidence of stroke or hemorrhage.  A MRI without contrast was ordered at the beginning of  to follow-up on the patient's left leg weakness and found an area of encephalomalacia in the right cerebral hemisphere that could be consistent with chronic ischemic stroke.  The radiologist that read this report recommended follow-up with MRI with gadolinium.  I have reviewed these recommendations with the patient and he does not wish to proceed with any further neurologic workup at this time.  I have also recommended intracranial and carotid vascular imaging which she declines.  He does have a carotid ultrasound that is scheduled at the beginning of August and I have encouraged him to keep this appointment as it will give us insight if he has any atherosclerotic disease that could have contributed to his possible stroke.  I have given him  feedback that I cannot give him detailed information about the abnormality found on his MRI without following up with contrast as per recommendations of radiologist.  He verbalized understanding.  He does not wish to wear a cardiac monitor at this time to evaluate for atrial fibrillation.  Patient will continue taking aspirin, Plavix and high intensity statin as per cardiology recommendations following his stent.  I would recommend continuing this until further details are found about the patient's carotid artery.  If he does not have high-grade stenosis of his bilateral carotid arteries at the conclusion of his 1 year of dual antiplatelet therapy to the best of my knowledge it would be appropriate for the patient to be on aspirin monotherapy but will defer to medication selection to his cardiologist as the patient does not wish to be followed in the stroke clinic moving forward.  I have given the patient our office number should he change his mind and wish to continue with the neurologic workup.      Stroke Risk Factors: diabetes mellitus, hyperlipidemia, and hypertension      Subjective      Review of Systems:   Review of Systems   Constitutional:  Positive for activity change and fatigue. Negative for appetite change, chills, diaphoresis, fever, unexpected weight gain and unexpected weight loss.   HENT:  Negative for trouble swallowing.    Eyes:  Negative for blurred vision, double vision, photophobia and visual disturbance.   Respiratory:  Negative for cough and shortness of breath.    Cardiovascular:  Negative for chest pain, palpitations and leg swelling.   Gastrointestinal:  Negative for blood in stool.   Genitourinary:  Negative for hematuria.   Musculoskeletal:  Positive for gait problem.   Neurological:  Positive for weakness. Negative for dizziness, tremors, seizures, syncope, facial asymmetry, speech difficulty, light-headedness, numbness, headache, memory problem and confusion.   Psychiatric/Behavioral:   Negative for depressed mood. The patient is not nervous/anxious.        Past Medical History:   Past Medical History:   Diagnosis Date    Arthritis     Asthma     Bronchitis     Diabetes mellitus     Gout     Hyperlipidemia     Hypertension     Kidney stones     Prostate cancer        Past Surgical History:   Past Surgical History:   Procedure Laterality Date    ADENOIDECTOMY      CARDIAC CATHETERIZATION N/A 4/29/2025    Procedure: Left Heart Cath;  Surgeon: Rebecca Fang MD;  Location:  [facility];  Service: Cardiovascular;  Laterality: N/A;    CARDIAC CATHETERIZATION N/A 4/29/2025    Procedure: Stent MARI coronary;  Surgeon: Rebecca Fang MD;  Location:  [facility];  Service: Cardiovascular;  Laterality: N/A;    ENDOSCOPY N/A 10/14/2023    Procedure: ESOPHAGOGASTRODUODENOSCOPY;  Surgeon: Samy Real MD;  Location:  [facility];  Service: Gastroenterology;  Laterality: N/A;    HERNIA REPAIR      PROSTATECTOMY      THYROID SURGERY Left     TONSILLECTOMY         Family History:   Family History   Problem Relation Age of Onset    No Known Problems Mother     Heart disease Father     Heart attack Paternal Uncle     Stroke Paternal Uncle     Pancreatic cancer Maternal Grandfather 72    Heart attack Paternal Grandmother     Heart attack Paternal Grandfather        Social History:   Social History     Socioeconomic History    Marital status:    Tobacco Use    Smoking status: Never     Passive exposure: Never    Smokeless tobacco: Never   Vaping Use    Vaping status: Never Used   Substance and Sexual Activity    Alcohol use: Yes     Alcohol/week: 21.0 standard drinks of alcohol     Types: 21 Shots of liquor per week     Comment: Pt states he drinks 6,  2oz mixed drinks ever 24 hours    Drug use: Never    Sexual activity: Defer       Medications:     Current Outpatient Medications:     acetaminophen (TYLENOL) 500 MG tablet, Take 2 tablets by mouth Every  Morning. (Patient taking differently: Take 2 tablets by mouth Every Morning. PRN), Disp: , Rfl:     allopurinol (ZYLOPRIM) 100 MG tablet, Take 1 tablet by mouth Daily., Disp: , Rfl:     amLODIPine (NORVASC) 5 MG tablet, Take 1 tablet by mouth Every Night., Disp: , Rfl:     aspirin 81 MG EC tablet, Take 1 tablet by mouth 2 (Two) Times a Week. PRN, Disp: , Rfl:     atorvastatin (LIPITOR) 80 MG tablet, Take 1 tablet by mouth Daily., Disp: , Rfl:     Cholecalciferol 25 MCG (1000 UT) tablet, Take 1 tablet by mouth Every 30 (Thirty) Days., Disp: , Rfl:     clopidogrel (PLAVIX) 75 MG tablet, Take 1 tablet by mouth Daily., Disp: 90 tablet, Rfl: 3    docusate sodium (COLACE) 100 MG capsule, Take 1 capsule by mouth As Needed for Constipation., Disp: , Rfl:     Insulin Glargine (LANTUS SOLOSTAR) 100 UNIT/ML injection pen, Inject 30 Units under the skin into the appropriate area as directed Every Night., Disp: , Rfl:     Insulin Lispro, 1 Unit Dial, (HUMALOG) 100 UNIT/ML solution pen-injector, Inject 10 Units under the skin into the appropriate area as directed., Disp: , Rfl:     loratadine (CLARITIN) 10 MG tablet, Take 1 tablet by mouth Daily As Needed., Disp: , Rfl:     metoprolol succinate XL (TOPROL-XL) 100 MG 24 hr tablet, Take 1 tablet by mouth Daily., Disp: , Rfl:     Multiple Vitamins-Minerals (AIRBORNE PO), Take  by mouth As Needed., Disp: , Rfl:     nitroglycerin (NITROSTAT) 0.4 MG SL tablet, 1 under the tongue as needed for angina, may repeat q5mins for up three doses, Disp: 30 tablet, Rfl: 11    Pediatric Multiple Vitamins (FLINTSTONES MULTIVITAMIN PO), Take  by mouth Daily., Disp: , Rfl:     potassium chloride 10 MEQ CR tablet, Take 1 tablet by mouth Every 30 (Thirty) Days., Disp: , Rfl:     tacrolimus (PROTOPIC) 0.1 % ointment, APPLY A THIN LAYER TO THE AFFECTED AREA(S) BY TOPICAL ROUTE 2 TIMES PER DAY ; RUB IN GENTLY AND COMPLETELY, Disp: , Rfl:     Allergies:   Allergies   Allergen Reactions    Amoxicillin Other  "(See Comments)     Causes hair to fall out    Glipizide Other (See Comments)     Weight gain    Ct Contrast Other (See Comments)     Body aches    Metformin Other (See Comments)     constipation       Objective     Physical Exam:  Vital Signs:   Vitals:    07/08/25 1415   BP: 134/80   Pulse: 63   Temp: 98.3 °F (36.8 °C)   SpO2: 97%   Weight: 110 kg (242 lb)   Height: 182.9 cm (72.01\")     Body mass index is 32.81 kg/m².     Physical Exam  Vitals and nursing note reviewed.   Constitutional:       General: He is awake. He is not in acute distress.     Appearance: Normal appearance. He is obese. He is not ill-appearing.      Comments: 70 year old  male    HENT:      Head: Normocephalic and atraumatic.      Nose: Nose normal.      Mouth/Throat:      Mouth: Mucous membranes are moist.   Eyes:      General: Lids are normal.      Extraocular Movements: Extraocular movements intact.      Pupils: Pupils are equal, round, and reactive to light.   Cardiovascular:      Rate and Rhythm: Normal rate and regular rhythm.      Pulses: Normal pulses.   Pulmonary:      Effort: Pulmonary effort is normal. No respiratory distress.   Skin:     General: Skin is warm and dry.   Neurological:      Mental Status: He is alert and oriented to person, place, and time.      Cranial Nerves: No cranial nerve deficit.      Sensory: No sensory deficit.      Motor: Weakness present.      Coordination: Coordination normal.      Gait: Gait abnormal.   Psychiatric:         Mood and Affect: Mood normal.         Speech: Speech normal.         Behavior: Behavior normal.       Neurological Exam  Mental Status  Awake and alert. Oriented to person, place, time and situation. Oriented to person, place, and time. Speech is normal. Language is fluent with no aphasia. Attention and concentration are normal. Fund of knowledge is appropriate for level of education.    Cranial Nerves  CN II: Right visual acuity: Counts fingers. Left visual acuity: Counts " fingers. Visual fields full to confrontation.  CN III, IV, VI: Extraocular movements intact bilaterally. Normal lids and orbits bilaterally. Pupils equal round and reactive to light bilaterally.  CN V: Facial sensation is normal.  CN VII: Full and symmetric facial movement.  CN VIII: Hearing is normal to speech .    Motor  Normal muscle bulk throughout. No fasciculations present. Normal muscle tone. Strength is 5/5 in all four extremities except as noted.  LLE 3+/5, mild drift. Foot drop present. .    Sensory  Light touch is normal in upper and lower extremities.     Coordination  Right: Finger-to-nose normal.Left: Finger-to-nose normal.  No obvious dysmetria .    Gait   Abnormal gait.Casual gait:  Walks with cane .       Modified Lynn Score: 2        0  No Symptoms    1 No significant disability. Able to carry out all usual activities, despite some symptoms.    2 Slight disability. Able to look after own affairs without assistance, but unable to carry out all previous activities.    3 Moderate disability. Requires some help, but able to walk unassisted.    4 Moderately severe disability. Unable to attend to own bodily needs without assistance, and unable to walk unassisted.    5 Severe disability. Requires constant nursing care and attention, bedridden, incontinent.    6 Dead       PHQ-9 Depression Screening  Little interest or pleasure in doing things? Not at all   Feeling down, depressed, or hopeless? Not at all   PHQ-2 Total Score 0   Trouble falling or staying asleep, or sleeping too much?     Feeling tired or having little energy?     Poor appetite or overeating?     Feeling bad about yourself - or that you are a failure or have let yourself or your family down?     Trouble concentrating on things, such as reading the newspaper or watching television?     Moving or speaking so slowly that other people could have noticed? Or the opposite - being so fidgety or restless that you have been moving around a lot  "more than usual?       Thoughts that you would be better off dead, or of hurting yourself in some way?     PHQ-9 Total Score     If you checked off any problems, how difficult have these problems made it for you to do your work, take care of things at home, or get along with other people?           STOP-Bang Score  Have you been diagnosed with Sleep Apnea?: no  Snoring?: no  Tired?: no  Observed?: no  Pressure?: yes  Stop Score: 1  Body Mass Index more than 35 kg/m2?: no  Age older than 50 year old?: yes  Neck large? \">17\"/43cm-M, >16\"/41cm-F: yes  Gender=Male?: yes  Total Stop-Bang Score: 4       STEADI Fall Risk Clinician Key Questions   Have you fallen in the past year?: Yes (once)      Imaging Reviewed:   Cardiac Catheterization/Vascular Study  Result Date: 4/29/2025  Occluded RCA with good left-to-right collaterals. Status post successful PTCA/stent placement in the proximal first obtuse marginal using a 3.0 x 28 mm Xience drug-eluting stent. 80% diagonal stenosis too small for intervention Otherwise moderate coronary disease RECOMMENDATIONS: Medical management of the patient's right coronary disease as stent placement would be futile. Medical management of the patient's sub-1 mm diagonal branch stenosis Continued aggressive risk factor modification Plavix for at least 6 months but preferably 1 year Aspirin indefinitely Indications: Chest pain consistent with unstable angina patient with a positive stress test suggesting inferior ischemia Access: Right radial artery Estimated blood loss: Less than 15 mL Procedures: Left heart catheterization. Selective coronary angiography. PTCA/stent placement in the first obtuse marginal branch. Procedure narrative: The patient was brought to the catheterization lab in a fasting condition.  Access site was prepped and draped in standard sterile fashion.  Lidocaine was injected and arterial access was obtained by percutaneous anterior wall puncture technique.  A 6 Czech " arterial sheath was placed in the right radial artery using a modified Seldinger technique.  Selective coronary arteriography was performed using the Pedro Luis technique with a 6 [place] AL-1 catheter due to the anterior takeoff and a 6 [place] 3.5 curved Pedro Luis left catheter.  Nonionic contrast was used and was injected manually.  Left heart pressures were obtained however left ventriculography was not performed.  At the time of the procedure the patient was noted to have a significant first obtuse marginal stenosis.  Heparin was administered for final ACT of 349 seconds.  A CLS 3 guide ultimately was seated appropriately within the left main.  A 0.014 182 cm luge wire was placed across the lesion with minimal difficulty.  A 2.0 x 15 mm mini trek balloon was placed within the lesion distally and inflated to 12 sandrine for 20 seconds.  A second ablation was performed proximally to 2 sandrine for 20 seconds.  A 3.0 x 28 mm Xience drug-eluting stent was then placed within the lesion and deployed at 14 sandrine for 20 seconds.  Postdilation was performed using a 3.0 x 20 mm NC trek balloon inflated to 18 sandrine distally for 20 seconds and then proximally for 20 seconds.  A good result was obtained.  There were no complications. Contrast: 200 ml Hemodynamic Findings: LV pressure: 100/10/14 mmHg, on pull back no gradient was recorded across the aortic valve. Ao pressure: 100/65 mmHg Left ventriculography: Not performed Angiographic Findings: LMCA: The left main coronary artery gives rise to the LAD and circumflex vessels and is free of significant disease. Circumflex: The circumflex coronary is nondominant for the posterior circulation and gives rise to a large first obtuse marginal branch large second obtuse marginal branch a large third obtuse marginal branch and a small AV groove vessel.  The first obtuse marginal contains a long proximal 80-90% stenosis.  Collateral flow to the distal right coronary seen from the circumflex  distribution. LAD: The LAD gives rise to a small first diagonal branch, a large bifurcating second diagonal branch a small third diagonal branch and terminates near the LV apex.  The LAD contains minor irregularities.  The small subsegmental branch of the second large diagonal is diffusely diseased with an estimated 80% stenosis and less than 1 mm in diameter. RCA: The right coronary artery has an anterior takeoff and is occluded in the midportion of the vessel.  There is approximately 90 mm of % stenosis thereafter.  Collateral flow from the left circulation is seen.        Laboratory Results:   Hemoglobin   Date Value Ref Range Status   04/29/2025 15.9 13.0 - 17.7 g/dL Final     Hematocrit   Date Value Ref Range Status   04/29/2025 46.7 37.5 - 51.0 % Final     Platelets   Date Value Ref Range Status   04/29/2025 186 140 - 450 10*3/mm3 Final     Hemoglobin A1C   Date Value Ref Range Status   03/13/2025 7.90 (H) 4.80 - 5.60 % Final   12/02/2024 7.6 <5.7% Non-Diabetic % Final     LDL Cholesterol    Date Value Ref Range Status   04/29/2025 110 (H) 0 - 100 mg/dL Final     AST (SGOT)   Date Value Ref Range Status   04/29/2025 23 1 - 40 U/L Final     ALT (SGPT)   Date Value Ref Range Status   04/29/2025 25 1 - 41 U/L Final             Assessment / Plan      Assessment/Plan:   #Abnormal MRI  #Suspected right hemispheric stroke   -Outpatient MRI brain w/o contrast performed at [facility] revealed a focal area of encephalomalacia in the right cerebral hemisphere which was suspicious of interval infarct compared to prior CT scan.  The radiologist recommended follow-up with contrasted imaging.  Patient declines at this time.  I have reviewed with him that I cannot give him a detailed assessment of the abnormality without following the recommendations of the radiologist and he verbalized understanding.  - Presumably with patient having left lower extremity weakness and foot drop a right hemispheric  stroke seems plausible.  - I have no imaging of his intracranial or extracranial vessels to review.  A CTA/MRA head and neck would be helpful, however he is scheduled to go for a carotid ultrasound at the beginning of August and I have encouraged him to keep this appointment.  Patient reports he is trying to minimize any further healthcare visits or workup for financial reasons but he is willing to go to this appointment.  - Patient is being maintained on dual antiplatelet therapy with aspirin and Plavix following his recent cardiac cath with stent placement.  This has been recommended for minimum 6 months but ideally a year.  I am in agreement with that plan.  If there is no high-grade stenosis or atherosclerotic plaque in his carotid arteries he could likely transition to aspirin monotherapy at the conclusion of the year period.   - Continue high intensity statin.  , goal less than 70.  - A1c 7.9, goal less than 7.  Management per primary care provider.  - BP goals less than 130/80  - Patient was working with PT and OT but reports his insurance has ended his sessions.  He continues to work on these exercises at home.  - Recommend heart healthy diet and increased activity.  - Patient declines wearing a heart monitor.  - Patient does not wish to follow-up in the stroke clinic in the future.    Discussed the importance of medication compliance and lifestyle modifications (adequate blood pressure control, adequate control of hyperlipidemia, adequate glycemic control, increase physical activity, and healthy diet) to help reduce the risk of future cerebrovascular events.  Also discussed the signs symptoms that would warrant the patient return back to the emergency department including unilateral weakness, unilateral numbness, visual disturbances, loss of balance, speech difficulties, and/or a sudden severe headache.      Follow Up:   Return if symptoms worsen or fail to improve.      TOMASZ Lucas  [facility] [place]  Neuro Stroke

## 2025-08-01 ENCOUNTER — RESULTS FOLLOW-UP (OUTPATIENT)
Dept: CARDIOLOGY | Facility: CLINIC | Age: 70
End: 2025-08-01
Payer: MEDICARE

## 2025-08-01 ENCOUNTER — HOSPITAL ENCOUNTER (OUTPATIENT)
Dept: CARDIOLOGY | Facility: HOSPITAL | Age: 70
Discharge: HOME | End: 2025-08-01
Payer: MEDICARE

## 2025-08-01 VITALS — BODY MASS INDEX: 32.85 KG/M2 | HEIGHT: 72 IN | WEIGHT: 242.51 LBS

## 2025-08-01 DIAGNOSIS — R26.89 OTHER ABNORMALITIES OF GAIT AND MOBILITY: ICD-10-CM

## 2025-08-01 DIAGNOSIS — R93.89 ABNORMAL FINDING OF DIAGNOSTIC IMAGING: ICD-10-CM

## 2025-08-01 LAB
BH CV XLRA MEAS LEFT DIST CCA EDV: 12.9 CM/SEC
BH CV XLRA MEAS LEFT DIST CCA PSV: 62.9 CM/SEC
BH CV XLRA MEAS LEFT DIST ICA EDV: 22.4 CM/SEC
BH CV XLRA MEAS LEFT DIST ICA PSV: 56.8 CM/SEC
BH CV XLRA MEAS LEFT ICA/CCA RATIO: 0.72
BH CV XLRA MEAS LEFT MID CCA EDV: 17.6 CM/SEC
BH CV XLRA MEAS LEFT MID CCA PSV: 65.9 CM/SEC
BH CV XLRA MEAS LEFT MID ICA EDV: 15.4 CM/SEC
BH CV XLRA MEAS LEFT MID ICA PSV: 37.4 CM/SEC
BH CV XLRA MEAS LEFT PROX CCA EDV: 13.6 CM/SEC
BH CV XLRA MEAS LEFT PROX CCA PSV: 84.2 CM/SEC
BH CV XLRA MEAS LEFT PROX ECA EDV: 9.4 CM/SEC
BH CV XLRA MEAS LEFT PROX ECA PSV: 74.3 CM/SEC
BH CV XLRA MEAS LEFT PROX ICA EDV: 16.2 CM/SEC
BH CV XLRA MEAS LEFT PROX ICA PSV: 61 CM/SEC
BH CV XLRA MEAS LEFT PROX SCLA PSV: 206.5 CM/SEC
BH CV XLRA MEAS LEFT VERTEBRAL A EDV: 9.4 CM/SEC
BH CV XLRA MEAS LEFT VERTEBRAL A PSV: 39.9 CM/SEC
BH CV XLRA MEAS RIGHT DIST CCA EDV: 12.8 CM/SEC
BH CV XLRA MEAS RIGHT DIST CCA PSV: 60.4 CM/SEC
BH CV XLRA MEAS RIGHT DIST ICA EDV: 24 CM/SEC
BH CV XLRA MEAS RIGHT DIST ICA PSV: 59.7 CM/SEC
BH CV XLRA MEAS RIGHT ICA/CCA RATIO: 0.94
BH CV XLRA MEAS RIGHT MID CCA EDV: 12.2 CM/SEC
BH CV XLRA MEAS RIGHT MID CCA PSV: 53.7 CM/SEC
BH CV XLRA MEAS RIGHT MID ICA EDV: 13.9 CM/SEC
BH CV XLRA MEAS RIGHT MID ICA PSV: 39.8 CM/SEC
BH CV XLRA MEAS RIGHT PROX CCA EDV: 15.1 CM/SEC
BH CV XLRA MEAS RIGHT PROX CCA PSV: 63.7 CM/SEC
BH CV XLRA MEAS RIGHT PROX ECA EDV: 9.5 CM/SEC
BH CV XLRA MEAS RIGHT PROX ECA PSV: 67.7 CM/SEC
BH CV XLRA MEAS RIGHT PROX ICA EDV: 13.6 CM/SEC
BH CV XLRA MEAS RIGHT PROX ICA PSV: 51.7 CM/SEC
BH CV XLRA MEAS RIGHT PROX SCLA PSV: 104.7 CM/SEC
BH CV XLRA MEAS RIGHT VERTEBRAL A EDV: 12.7 CM/SEC
BH CV XLRA MEAS RIGHT VERTEBRAL A PSV: 43.1 CM/SEC
LEFT ARM BP: NORMAL MMHG

## 2025-08-01 PROCEDURE — 93880 EXTRACRANIAL BILAT STUDY: CPT

## (undated) DEVICE — CATH DIAG EXPO M/ PK 6FR FL4/FR4 PIG 3PK

## (undated) DEVICE — PK CATH CARD 10

## (undated) DEVICE — GUIDE CATHETER: Brand: MACH1™

## (undated) DEVICE — LN INJ CONTRST HP FIX M/F LL BR RA 1200PSI 10IN DISP STRL

## (undated) DEVICE — GW PERIPH GUIDERIGHT STD/EXCHNG/J/TIP SS 0.035IN 5X260CM

## (undated) DEVICE — TUBING, SUCTION, 1/4" X 10', STRAIGHT: Brand: MEDLINE

## (undated) DEVICE — GW LUGE .014 182 CM

## (undated) DEVICE — CONTN GRAD MEAS TRIANG 32OZ BLK

## (undated) DEVICE — PINNACLE INTRODUCER SHEATH: Brand: PINNACLE

## (undated) DEVICE — MINI TREK CORONARY DILATATION CATHETER 2.0 MM X 15 MM / RAPID-EXCHANGE: Brand: MINI TREK

## (undated) DEVICE — NC TREK NEO™ CORONARY DILATATION CATHETER 3.00 MM X 20 MM / RAPID-EXCHANGE: Brand: NC TREK NEO™

## (undated) DEVICE — THE BITE BLOCK MAXI, LATEX FREE STRAP IS USED TO PROTECT THE ENDOSCOPE INSERTION TUBE FROM BEING BITTEN BY THE PATIENT.

## (undated) DEVICE — CATH DIAG EXPO .056 AR2 6F 100CM

## (undated) DEVICE — SOLIDIFIER LIQ PREMISORB 1500CC

## (undated) DEVICE — HYBRID CO2 TUBING/CAP SET FOR OLYMPUS® SCOPES & CO2 SOURCE: Brand: ERBE

## (undated) DEVICE — TR BAND RADIAL ARTERY COMPRESSION DEVICE: Brand: TR BAND

## (undated) DEVICE — FIRST STEP BEDSIDE ADD WATER KIT - RESEALABLE STAND-UP POUCH, ENDOSCOPIC CLEANING PAD - 1 POUCH: Brand: FIRST STEP BEDSIDE ADD WATER KIT - RESEALABLE STAND-UP POUCH, ENDOSCOPIC CLEANIN

## (undated) DEVICE — KT ORCA ORCAPOD DISP STRL

## (undated) DEVICE — SYR LUERLOK 50ML

## (undated) DEVICE — LHK- LEFT HEART KIT BAPTIST: Brand: MEDLINE INDUSTRIES, INC.

## (undated) DEVICE — ADULT, W/LG. BACK PAD, RADIOTRANSPARENT ELEMENT AND LEAD WIRE COMPATIBLE W/: Brand: DEFIBRILLATION ELECTRODES

## (undated) DEVICE — SOL IRR H2O BTL 1000ML STRL

## (undated) DEVICE — INTRO SHEATH PRELUDE IDEAL SPRNG COIL .021 6F 16X80CM

## (undated) DEVICE — CATH DIAG IMPULSE FL3.5 6F 100CM

## (undated) DEVICE — LUBE JELLY FOIL PACK 1.4 OZ: Brand: MEDLINE INDUSTRIES, INC.

## (undated) DEVICE — DEV INFL MONARCH 25W

## (undated) DEVICE — INTRO ACCSR BLNT TP

## (undated) DEVICE — SAFELINER SUCTION CANISTER 1000CC: Brand: DEROYAL

## (undated) DEVICE — ADAPT CLN LUM OLYMP AIR/H20